# Patient Record
Sex: MALE | Race: WHITE
[De-identification: names, ages, dates, MRNs, and addresses within clinical notes are randomized per-mention and may not be internally consistent; named-entity substitution may affect disease eponyms.]

---

## 2019-10-01 ENCOUNTER — HOSPITAL ENCOUNTER (INPATIENT)
Dept: HOSPITAL 25 - AA | Age: 79
LOS: 2 days | Discharge: HOME HEALTH | DRG: 470 | End: 2019-10-03
Attending: ORTHOPAEDIC SURGERY | Admitting: ORTHOPAEDIC SURGERY
Payer: MEDICARE

## 2019-10-01 DIAGNOSIS — R06.83: ICD-10-CM

## 2019-10-01 DIAGNOSIS — Z79.01: ICD-10-CM

## 2019-10-01 DIAGNOSIS — I34.0: ICD-10-CM

## 2019-10-01 DIAGNOSIS — I47.2: ICD-10-CM

## 2019-10-01 DIAGNOSIS — Z96.651: ICD-10-CM

## 2019-10-01 DIAGNOSIS — Z72.89: ICD-10-CM

## 2019-10-01 DIAGNOSIS — I10: ICD-10-CM

## 2019-10-01 DIAGNOSIS — G89.29: ICD-10-CM

## 2019-10-01 DIAGNOSIS — E66.9: ICD-10-CM

## 2019-10-01 DIAGNOSIS — Z82.3: ICD-10-CM

## 2019-10-01 DIAGNOSIS — M16.11: Primary | ICD-10-CM

## 2019-10-01 DIAGNOSIS — Z88.8: ICD-10-CM

## 2019-10-01 DIAGNOSIS — I48.19: ICD-10-CM

## 2019-10-01 DIAGNOSIS — R73.01: ICD-10-CM

## 2019-10-01 DIAGNOSIS — Z87.891: ICD-10-CM

## 2019-10-01 DIAGNOSIS — E78.5: ICD-10-CM

## 2019-10-01 DIAGNOSIS — M17.12: ICD-10-CM

## 2019-10-01 DIAGNOSIS — I71.2: ICD-10-CM

## 2019-10-01 LAB
APTT PPP: 33.2 SECONDS (ref 26–38)
INR PPP/BLD: 0.98 (ref 0.82–1.09)

## 2019-10-01 PROCEDURE — 85730 THROMBOPLASTIN TIME PARTIAL: CPT

## 2019-10-01 PROCEDURE — 93005 ELECTROCARDIOGRAM TRACING: CPT

## 2019-10-01 PROCEDURE — 85018 HEMOGLOBIN: CPT

## 2019-10-01 PROCEDURE — C1776 JOINT DEVICE (IMPLANTABLE): HCPCS

## 2019-10-01 PROCEDURE — 0SR902A REPLACEMENT OF RIGHT HIP JOINT WITH METAL ON POLYETHYLENE SYNTHETIC SUBSTITUTE, UNCEMENTED, OPEN APPROACH: ICD-10-PCS | Performed by: ORTHOPAEDIC SURGERY

## 2019-10-01 PROCEDURE — 85049 AUTOMATED PLATELET COUNT: CPT

## 2019-10-01 PROCEDURE — 88304 TISSUE EXAM BY PATHOLOGIST: CPT

## 2019-10-01 PROCEDURE — C1713 ANCHOR/SCREW BN/BN,TIS/BN: HCPCS

## 2019-10-01 PROCEDURE — 36415 COLL VENOUS BLD VENIPUNCTURE: CPT

## 2019-10-01 PROCEDURE — 85014 HEMATOCRIT: CPT

## 2019-10-01 PROCEDURE — 72170 X-RAY EXAM OF PELVIS: CPT

## 2019-10-01 PROCEDURE — 88311 DECALCIFY TISSUE: CPT

## 2019-10-01 PROCEDURE — 80048 BASIC METABOLIC PNL TOTAL CA: CPT

## 2019-10-01 PROCEDURE — 85610 PROTHROMBIN TIME: CPT

## 2019-10-01 RX ADMIN — OXYCODONE HYDROCHLORIDE AND ACETAMINOPHEN PRN TAB: 5; 325 TABLET ORAL at 16:07

## 2019-10-01 RX ADMIN — MAGNESIUM HYDROXIDE SCH ML: 400 SUSPENSION ORAL at 20:30

## 2019-10-01 RX ADMIN — OXYCODONE HYDROCHLORIDE AND ACETAMINOPHEN PRN TAB: 5; 325 TABLET ORAL at 20:29

## 2019-10-01 RX ADMIN — CEFAZOLIN SCH MLS/HR: 1 INJECTION, POWDER, FOR SOLUTION INTRAVENOUS at 18:09

## 2019-10-01 RX ADMIN — SODIUM CHLORIDE, SODIUM LACTATE, POTASSIUM CHLORIDE, AND CALCIUM CHLORIDE SCH MLS/HR: 600; 310; 30; 20 INJECTION, SOLUTION INTRAVENOUS at 14:34

## 2019-10-01 RX ADMIN — Medication SCH: at 18:10

## 2019-10-01 RX ADMIN — DOCUSATE SODIUM SCH MG: 100 CAPSULE, LIQUID FILLED ORAL at 20:30

## 2019-10-01 RX ADMIN — METOPROLOL SUCCINATE SCH MG: 50 TABLET, EXTENDED RELEASE ORAL at 18:17

## 2019-10-01 NOTE — CONS
CC:  Dr. Fofana; Irene Queen NP; Dr. Jesus Alberto Youssef

 

CONSULTATION REPORT:

 

DATE OF CONSULT:  10/01/19

 

TIME OF EVALUATION:  01:15 p.m.

 

REQUESTING PHYSICIAN:  Dr. Fofana.

 

PRIMARY CARE PROVIDER:  Irene Queen NP.

 

CARDIOLOGIST:  Dr. Jesus Alberto Youssef, phone number [phone]; Dr. Leonardo Liu at [facility] in [place], [place], phone number [phone].

 

REASON FOR CONSULTATION:  Management of comorbidities.

 

HISTORY OF PRESENT ILLNESS:  Mr. Montanez is a 79-year-old male with a past medical history of hypert
ension, osteoarthritis, atrial fibrillation, thoracic aortic aneurysm, nonsustained ventricular tachy
cardia, obesity with a BMI of 35.2, who was found to have advanced arthritis of the right hip with ch
ronic pain.  He failed conservative management with antiinflammatories, physial therapy, ultrasound-g
uided injections, and continued to have 6/10 pain.  So, at that point, Dr. Fofana recommended right to
kiera hip arthroplasty that was performed today under spinal anesthesia with estimated blood loss less 
than 150 mL.

 

The patient was seen and evaluated in the PACU while he is still recovering from anesthesia and he of
fers no complaints at this time.  As per nurse, the patient was snoring loudly initially when he was 
brought into the PACU, but the patient is not aware of any diagnosis of obstructive sleep apnea.

 

PAST MEDICAL HISTORY:

1.  Obesity with a BMI of 35.2.

2.  Hypertension.

3.  Atrial fibrillation, on anticoagulation with Pradaxa.

4.  Osteoarthritis.

5.  Aortic thoracic aneurysm.  Last echocardiogram done on 19 showed mild dilatation of the asc
ending aorta at 4.1 cm.

6.  Nonsustained ventricular tachycardia in , on beta-blockers since. PAST SURGICAL HISTORY:

1.  Status post knee arthroscopy in  and .

2.  Status post knee replacement in 2013 on the right.

 

MEDICATION LIST:

1.  Acetaminophen 500 mg p.o. daily as needed for pain.

2.  Amlodipine/olmesartan 5/20 mg 1 tablet p.o. daily.

3.  Vitamin C 500 mg p.o. daily.

4.  Vitamin B12 1000 mcg p.o. daily.

5.  Dabigatran 150 mg p.o. b.i.d.

6.  Iron 370 mg p.o. at bedtime.

7.  Metoprolol succinate 50 mg at bedtime.

8.  Multivitamin 1 tablet p.o. daily.

 

ALLERGIES:  With WARFARIN, the patient had the rash.

 

FAMILY HISTORY:  The patient's father  of a stroke when he was 92.  Mother is 99 years old.

 

SOCIAL HISTORY:  The patient is retired.  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  Denies drug u
se.  He was a smoker 20 pack years, but he quit 30 years ago. He drinks 2 to 3 beers, wine in social 
events and 2 to 3 shots of vodka, whisky at bedtime.

 

REVIEW OF SYSTEMS:  A 14-point review of systems was performed and all the pertinent negative an posi
tive findings on the HPI.

 

PHYSICAL EXAMINATION:  Vital Signs:  Temperature 97.0, heart rate is 64, respiratory rate is 19, oxyg
en saturation 95% on room air, blood pressure is 92/67. General:  The patient is an obese elderly gen
tleman, lying in bed in acute distress.  HEENT:  Pupils are equal.  Moist mucous membranes.  CVS:  No
rmal S1, S2. Regular rate and rhythm.  Chest:  Breath sounds present bilaterally with no added sounds
.  Abdomen:  Obese, soft.  Bowel sounds are present.  Extremities:  The patient has a clean dressing 
intact to his right hip and he is still recovering from anesthesia.  So, sensation is not totally bac
k yet.  Neuro:  He is alert and oriented x3.  Able to follow commands.

 

ASSESSMENT AND PLAN:  Mr. Montanez is a 79-year-old male with a past medical history of obesity, hype
rtension, osteoarthritis, atrial fibrillation, on dabigatran, aortic thoracic aneurysm, nonsustained 
ventricular tachycardia, who presented for an elective right total hip arthroplasty and the [facility] service was consulted to help manage his commorientes.

 

1.  Status post right total hip arthroplasty.  Management will be as per ORTHO.

2.  Hypertension.  The patient is hypotensive at this time.  We will resume his antihypertensives as 
tolerated and we prefer to give him metoprolol first as this will also manage his nonsustained ventri
cular tachycardia.

3.  Atrial fibrillation.  The patient will be monitored on telemetry.  We will resume the dabigatran 
when okay with Orthopedics.

4.  Nonsustained ventricular tachycardia.  We will continue to monitor on telemetry.

5.  Probable obstructive sleep apnea.  The patient is obese and was described to have severe snoring 
when he returned from surgery.  I suspect the patient likely has obstructive  sleep apnea.  We will m
onitor his oxygen saturation around the first 24 hours postop and he will need a sleep study as an ou
tpatient.

6.  DVT prophylaxis will be with dabigatran as per orthopedist.

7.  Code status is full.

 

TIME SPENT:  Approximately 60 minutes were spent with patient interview, medical records review, and 
physical examination to complete this consultation.  More than half of this time was spent face-to-fa
ce with the patient in coordination of care.

 

 [id number]/CPS #: [id number]

## 2019-10-01 NOTE — XMS REPORT
Continuity of Care Document (CCD)

 Created on:2019



Patient:Jorge L Montanez

Sex:Male

:1940

External Reference #:[id number]





Demographics







 Address  [address] [place] [address]

 

 Home Phone  [phone]

 

 Mobile Phone  [phone]

 

 Work Phone  [phone]

 

 Email Address  [email or web address]

 

 Preferred Language  en

 

 Marital Status  Not  or 

 

 Yarsanism Affiliation  Unknown

 

 Race  White

 

 Ethnic Group  Not  or 









Author







 Name  Karol Valencia









Care Team Providers







 Name  Role  Phone

 

 Jesus Alberto Russell MD  Care Team Information   [phone]

 

 Lauri Zamudio DR  Care Team Information   [phone]

 

 Augustus Marion  Care Team Information   [phone]

 

 Mónica Fofana DR - Adult  Care Team Information   [phone]



 Reconstructive Orthopaedic Surgery    









Problems







 Active Problems  Provider  Date

 

 Benign essential hypertension  Obdulio Steward MD  Onset: 2006

 

 Aneurysm of thoracic aorta  Paloma Arnett MD  Onset: 2014

 

 Paroxysmal ventricular tachycardia  Paloma Arnett MD  Onset: 2014

 

 Paroxysmal atrial fibrillation  Paloma Arnett MD  Onset: 2015









 Note: 









 Essential hypertension  Irene Queen, RN MS FNP  Onset: 2016







Social History







 Type  Date  Description  Comments

 

 Birth Sex    Unknown  

 

 Tobacco Use  Start: Unknown  Never Smoked Cigarettes  quitted 30yr ago,



       smoking 20 yrsX 1ppd

 

 ETOH Use    Consumes liquor 2 times  2-3 beers/wine



     per day  during social events



       2-3 shots of



       vodhka/whisky qHS

 

 Tobacco Use  Start: Unknown End:  Patient is a former  



   Unknown  smoker  

 

 Smoking Status  Reviewed: 19  Patient is a former  



     smoker  

 

 Enjoy Exercising    Enjoys Exercising  

 

 Exercise Type/Frequency    Exercises regularly  Not as active as he



       was due to left



       ankle pain.







Allergies, Adverse Reactions, Alerts







 Active Allergies  Reaction  Severity  Comments  Date

 

 Coumadin  rash      2014









 Inactive Allergies









 NKDA        03/10/2006







Medications







 Active Medications  SIG  Qnty  Indications  Ordering Provider  Date

 

 Amlodipine/Olmesarta  one tab daily for  90tabs  I10  Irene Queen  2019



 n Medoxomil  blood pressure      C, RN MS ISABELLE  



            5-20mg          



 Tablets          



           

 

 Metoprolol Succinate  Take 1 Tablet AT  90tabs  I48.2  Irene Queen  2017



 ER  Bedtime      C, RN MS [facility]  



   50mg Tablets ER          



 24HR          



           

 

 Pradaxa  Take 1 Capsule  180caps  I48.1  Irene Queen  2015



        150mg  Twice Daily      C, RN MS ISABELLE  



 Capsules          



           









 I48.2

 

 History Medications









 Amlodipine Besylate  1 by mouth every day  90tabs  I10  Irene Queen  2019 -



         KINDRA RN MS [facility]  2019



 5mg Tablets          



           

 

 Boostrix  ,5 cubic centimeters,  .500ml  Z23  Irene Queen  2019 -



   intramuscular x 1      C, RN MS [facility]  2019



 5-2.5-18.5LF-mcg/0.  injection        



 5 Suspension          



           







Medications Administered in Office







 Medication  SIG  Qnty  Indications  Ordering Provider  Date

 

 Depo Medrol 20 MG        Obdulio Steward MD  2001



        Injection          



           







Immunizations







 CPT Code  Status  Date  Vaccine  Lot #

 

 13490  Given  2019  Tdap (Adacel) Ages 7 And Above Only  

 

 11659  Given  2019  Pneumococcal 23 Immunization Adult Or  [id number]



       Immunosuppressed Patient  

 

 64850  Given  2018  Influenza Vac, Quadrivalent, Split, 0.5mL  [id number]



       Dosage, Im Use  

 

 77495  Given  2017  Influenza Vac, Quadrivalent, Split, 0.5mL  [id number]



       Dosage, Im Use  

 

 15823  Given  2016  Influenza Vac, Quadrivalent, Split, 0.5mL  



       Dosage, Im Use  

 

   Given  10/16/2015  Fluzone Trivalent Immunization  

 

 18894  Given  2015  Prevnar 13 Pneumococal Conjugate Vaccine  [id number]

 

 43886  Given  2004  Afluria Or Fluvirin Flu Vac Intramuscular  







Vital Signs







 Date  Vital  Result  Comment

 

 2019  9:53am  Weight  270.00 lb  









 Heart Rate  101 /min  72

 

 BP Systolic  104 mmHg  

 

 BP Diastolic  69 mmHg  

 

 Height  72 inches  6'0"

 

 BMI (Body Mass Index)  36.6 kg/m2  









 2019 12:58pm  Weight  276.25 lb  









 Heart Rate  81 /min  

 

 BP Systolic  114 mmHg  

 

 BP Diastolic  66 mmHg  

 

 Height  72 inches  6'0"

 

 BMI (Body Mass Index)  37.5 kg/m2  







Results







 Test  Date  Facility  Test  Result  H/L  Range  Note

 

 CBC with Auto Diff-[facility]cmg  2019  [facility]  WBC  5.8 K/uL    4.1-11.0  









 RBC  3.53 M/uL  Low  4.60-6.10  

 

 Hemoglobin  12.2 gm/dL  Low  13.5-18.0  

 

 Hematocrit  36.0 %  Low  41.0-53.0  

 

 MCV  101.9 fL  High  80.0-97.0  

 

 MCH  34.6 pg  High  27.0-32.0  

 

 MCHC  34.0 g/dL    32.0-36.0  

 

 RDW  14.3 %    11.5-14.5  

 

 PLT Count  196 K/ul    140-400  

 

 MPV  8.0 FL    7.1-10.7  

 

 Neutrophil  65.3 %    35.0-75.0  

 

 Lymphocyte  21.7 %    16.0-52.0  

 

 Monocyte  10.6 %  High  2.0-10.0  

 

 Eosinophil  1.4 %    0.0-5.0  

 

 Basophil  1.0 %    0.0-4.0  

 

 Abs Neutrophils  3.8 K/uL    2.1-8.0  

 

 Abs Lymphocytes  1.3 K/uL    0.8-5.5  

 

 Abs Monocytes  0.6 K/uL    0.1-1.0  

 

 Abs Eosinophils  0.1 K/uL    0.0-0.5  

 

 Abs Basophils  0.1 K/uL    0.0-0.3  









 Comprehensive Met Panel-[facility]CMG  2019  [facility]  Sodium  141 mmol/L    135-
146  1









 Potassium  4.5 mmol/L    3.5-5.2  

 

 Chloride#  104 mmol/L      2

 

 Carbon Dioxide  27 mmol/L    24-34  

 

 Calcium  9.3 mg/dL    8.5-10.5  3

 

 Glucose  97 mg/dL      

 

 BUN  23 mg/dL    6-26  

 

 Creatinine  1.1 mg/dL    0.5-1.4  

 

 Total Protein  6.2 g/dL    6.0-8.0  

 

 Albumin  4.1 g/dL    3.6-4.9  

 

 Globulin  2.1 g/dL    2.0-3.5  

 

 A/G Ratio  2.0 Ratio    1.0-2.2  

 

 Total Bilirubin  1.0 mg/dL    0.1-1.3  

 

 Alkaline Phosphatase  82 U/L      

 

 Alt  37 U/L    3-42  

 

 Ast  27 U/L    8-42  

 

 Anion Gap  10 mmol/L    5-15  4

 

 Female Egfr  46  Low  >60  5

 

 Male Egfr  61    >60  6









 Laboratory test  2019  [facility]  PSA  3.150 ng/mL    0.000-4.000  7



 finding              

 

 Hemoglobin A1c  2019  [facility]  Hemoglobin A1c  6.4 %  High  4.1-5.9  









 Estimated Average Glucose Calc  137 mg/dL      









 Laboratory test finding  2019  [facility]  Magnesium  1.7 mg/dL    1.5-2.7  

 

 Comprehensive Met Panel-[facility]G  2019  [facility]  Sodium  140 mmol/L    135-
146  8, 9









 Potassium  4.8 mmol/L    3.5-5.2  

 

 Chloride#  105 mmol/L      10

 

 Carbon Dioxide  28 mmol/L    24-34  

 

 Calcium  9.2 mg/dL    8.5-10.5  11

 

 Glucose  128 mg/dL  High    

 

 BUN  22 mg/dL    6-26  

 

 Creatinine  0.8 mg/dL    0.5-1.4  

 

 Total Protein  6.3 g/dL    6.0-8.0  

 

 Albumin  4.1 g/dL    3.6-4.9  

 

 Globulin  2.2 g/dL    2.0-3.5  

 

 A/G Ratio  1.9 Ratio    1.0-2.2  

 

 Total Bilirubin  1.1 mg/dL    0.1-1.3  

 

 Alkaline Phosphatase  105 U/L      

 

 Alt  32 U/L    3-42  

 

 Ast  30 U/L    8-42  

 

 Anion Gap  7 mmol/L    5-15  12

 

 Female Egfr  69    >60  13

 

 Male Egfr  85    >60  14









 Hemoglobin A1c  2019  [facility]  Hemoglobin A1c  5.9 %    4.1-5.9  









 Estimated Average Glucose Calc  123 mg/dL      









 CBC with Auto Diff-fcmg  2019  [facility]  WBC  4.5 K/uL    4.1-11.0  









 RBC  3.99 M/uL  Low  4.60-6.10  

 

 Hemoglobin  13.7 gm/dL    13.5-18.0  

 

 Hematocrit  39.8 %  Low  41.0-53.0  

 

 MCV  99.8 fL  High  80.0-97.0  

 

 MCH  34.4 pg  High  27.0-32.0  

 

 MCHC  34.5 g/dL    32.0-36.0  

 

 RDW  13.5 %    11.5-14.5  

 

 PLT Count  186 K/ul    140-400  

 

 MPV  8.0 FL    7.1-10.7  

 

 Neutrophil  53.9 %    35.0-75.0  

 

 Lymphocyte  30.3 %    16.0-52.0  

 

 Monocyte  11.8 %  High  2.0-10.0  

 

 Eosinophil  2.7 %    0.0-5.0  

 

 Basophil  1.3 %    0.0-4.0  

 

 Abs Neutrophils  2.4 K/uL    2.1-8.0  

 

 Abs Lymphocytes  1.4 K/uL    0.8-5.5  

 

 Abs Monocytes  0.5 K/uL    0.1-1.0  

 

 Abs Eosinophils  0.1 K/uL    0.0-0.5  

 

 Abs Basophils  0.1 K/uL    0.0-0.3  









 Hemoglobin A1c  2019  [facility]  Hemoglobin A1c  6.2 %  High  4.1-5.9  









 Estimated Average Glucose Calc  131 mg/dL      









 Comprehensive Met Panel-FCMG  2019  [facility]  Sodium  142 mmol/L    135-
146  15









 Potassium  4.4 mmol/L    3.5-5.2  

 

 Chloride#  108 mmol/L      16

 

 Carbon Dioxide  25 mmol/L    24-34  

 

 Glucose  119 mg/dL  High    

 

 BUN  22 mg/dL    6-26  

 

 Creatinine  0.8 mg/dL    0.5-1.4  

 

 Calcium  9.5 mg/dL    8.5-10.2  

 

 Total Protein  6.2 g/dL    6.0-8.0  

 

 Albumin  4.4 g/dL    3.6-4.9  

 

 Globulin  1.8 g/dL  Low  2.0-3.5  

 

 A/G Ratio  2.4 Ratio  High  1.0-2.2  

 

 Total Bilirubin  0.7 mg/dL    0.1-1.3  

 

 Alkaline Phosphatase  108 U/L      

 

 Alt  31 U/L    3-42  

 

 Ast  27 U/L    8-42  

 

 Anion Gap  9 mmol/L    5-15  17

 

  Jeannine Egfr  >60    >60  18

 

 Non  Jeannine Egfr  >60    >60  19









 Lipid  2019  [facility]  Cholesterol  163 mg/dL      









 Triglycerides  68 mg/dL      

 

 HDL  59 mg/dL    29-71  20

 

 Chol/ HDL Ratio  2.8 ratio  Low  4.0-6.7  

 

 VLDL  14 mg/dL    2-29  

 

 LDL (Calc)  91 mg/dL    20  21









 1  Updated reference range on new analyzer 3-2017

 

 2  Updated reference range on new analyzer 3-2017

 

 3  Updated reference range 2019

 

 4  Updated Reference Range 

 

 5  Concerning GFR Guidelines for  Americans:



   Normal function or mild renal disease, if clinically at risk:  >/= 60



   mL/min



   Moderately decreased:  30-59



   Severely decreased:  15-29



   Renal failure:  <15



   There is reduced accuracy above 60ml/min/1.73 m squared, but the



   numeric value may be clinically useful in the near 60 range

 

 6  Concerning GFR Guidelines:



   Normal function or mild renal disease, if clinically at risk:  >/= 60



   mL/min



   Moderately decreased:  30-59



   Severely decreased:  15-29



   Renal failure:  <15



   There is reduced accuracy above 60ml/min/1.73 m squared, but the



   numeric value may be clinically useful in the near 60 range



   



   Glomerular Filtration Rate (GFR) is estimated based on the CKD-EPI



   equation, which assumes a steady state for creatinine as recommended



   by the National Kidney Disease Education Program in conjunction with



   the National Institutes of Health and the National Kidney Foundation.



   



   Clinical conditions in which it may be necessary to measure GFR by



   using clearance methods include extremes of age and body size, severe



   malnutrition or obesity, diseases of skeletal muscle, paraplegia or



   quadriplegia, vegetarian diet, rapidly changing kidney function, and



   calculation of the dose of potentially toxic drugs that are excreted



   by the kidneys.

 

 7  Beginning 07 PSA values assayed at [facility] uses



   chemiluminescence methodology manufactured by [employer] for use



   on the DXI analyzer.  Values obtained with different assay methods or



   kits can not be used interchangeably.  Serum PSA measurement is not an



   absolute test for malignancy.  The PSA value should be used in



   conjunction with information available from clinical evaluation and



   other diagnostic procedures.

 

 8  This sample is drawn by:[facility].

 

 9  Updated reference range on new analyzer 3-2017

 

 10  Updated reference range on new analyzer 3-2017

 

 11  Updated reference range 2019

 

 12  Updated Reference Range 

 

 13  Concerning GFR Guidelines for  Americans:



   Normal function or mild renal disease, if clinically at risk:  >/= 60



   mL/min



   Moderately decreased:  30-59



   Severely decreased:  15-29



   Renal failure:  <15



   There is reduced accuracy above 60ml/min/1.73 m squared, but the



   numeric value may be clinically useful in the near 60 range

 

 14  Concerning GFR Guidelines:



   Normal function or mild renal disease, if clinically at risk:  >/= 60



   mL/min



   Moderately decreased:  30-59



   Severely decreased:  15-29



   Renal failure:  <15



   There is reduced accuracy above 60ml/min/1.73 m squared, but the



   numeric value may be clinically useful in the near 60 range



   



   Glomerular Filtration Rate (GFR) is estimated based on the CKD-EPI



   equation, which assumes a steady state for creatinine as recommended



   by the National Kidney Disease Education Program in conjunction with



   the National Institutes of Health and the National Kidney Foundation.



   



   Clinical conditions in which it may be necessary to measure GFR by



   using clearance methods include extremes of age and body size, severe



   malnutrition or obesity, diseases of skeletal muscle, paraplegia or



   quadriplegia, vegetarian diet, rapidly changing kidney function, and



   calculation of the dose of potentially toxic drugs that are excreted



   by the kidneys.

 

 15  Updated reference range on new analyzer 3-2017

 

 16  Updated reference range on new analyzer 3-2017

 

 17  Updated Reference Range 

 

 18  Concerning GFR Guidelines for  Americans:



   Normal function or mild renal disease, if clinically at risk:  >/= 60



   mL/min



   Moderately decreased:  30-59



   Severely decreased:  15-29



   Renal failure:  <15

 

 19  Concerning GFR Guidelines:



   Normal function or mild renal disease, if clinically at risk:  >/= 60



   mL/min



   Moderately decreased:  30-59



   Severely decreased:  15-29



   Renal failure:  <15



   



   Glomerular Filtration Rate (GFR) is estimated based on the MDRD



   equation, which assumes a steady state for creatinine as recommended



   by the National Kidney Disease Education Program in conjunction with



   the National Institutes of Health and the National Kidney Foundation.



   



   Clinical conditions in which it may be necessary to measure GFR by



   using clearance methods include extremes of age and body size, severe



   malnutrition or obesity, diseases of skeletal muscle, paraplegia or



   quadriplegia, vegetarian diet, rapidly changing kidney function, and



   calculation of the dose of potentially toxic drugs that are excreted



   by the kidneys.

 

 20  Per NCEP ATP III Guidelines:



   Results lower than 40 mg/dL are suggestive of increased risk for



   coronary artery disease.  Results > or = to 60 mg/dL are considered a



   negative risk factor.

 

 21  Per NCEP ATP III Guidelines:



   Normal Population <130



   Patients with medical conditions: CHD/DM



   Optimal: <100



   Borderline high: 130-159



   High: 160-189



   Very high: >189







Procedures







 Date  Code  Description  Status

 

 2019  32462  ECHO Transthoracis 2D W Spectral Doppler  Completed

 

 2019  98839  Electrocardiogram Complete  Completed

 

 2015  74162589  Colonoscopy  Completed

 

 2010  21930722  Colonoscopy  Completed







Medical Devices







 Description

 

 No Information Available







Encounters







 Type  Date  Location  Provider  Dx  Diagnosis

 

 Office Visit  2019  Irene Andrea,  Z01.818  Encounter for 
other



   10:20a    RN MS [facility]    preprocedural



           examination









 I34.0  Nonrheumatic mitral (valve) insufficiency

 

 I10  Essential (primary) hypertension

 

 I48.1  Persistent atrial fibrillation

 

 E66.9  Obesity, unspecified

 

 Z12.5  Encounter for screening for malignant neoplasm of prostate

 

 R73.01  Impaired fasting glucose

 

 Z68.36  Body mass index (BMI) 36.0-36.9, adult









 Office Visit  2019  1:00p  Irene Andrea I10  Essential (
primary)



       RN MS [facility]    hypertension









 R73.01  Impaired fasting glucose









 Office Visit  2019  8:20a  Irene Andrea,  E66.9  Obesity, 
unspecified



       RN MS [facility]    









 I10  Essential (primary) hypertension

 

 I48.2  Chronic atrial fibrillation

 

 M25.551  Pain in RIGHT hip

 

 M25.559  Pain in unspecified hip

 

 Z68.37  Body mass index (BMI) 37.0-37.9, adult









 Office Visit  2019  8:20a  Irene Andrea,  Z00.00  Encntr 
for general



       RN MS [facility]    adult medical exam



           w/o abnormal



           findings









 R73.01  Impaired fasting glucose

 

 I10  Essential (primary) hypertension

 

 M25.551  Pain in RIGHT hip

 

 E55.9  Vitamin D deficiency, unspecified

 

 Z23  Encounter for immunization

 

 D64.9  Anemia, unspecified

 

 Z13.31  Encounter for screening for depression

 

 Z68.37  Body mass index (BMI) 37.0-37.9, adult







Assessments







 Date  Code  Description  Provider

 

 2019  I10  Essential (primary) hypertension  [facility]

 

 2019  Z01.818  Encounter for other preprocedural  Irene Queen RN 
[facility]



     examination  

 

 2019  I34.0  Nonrheumatic mitral (valve)  [facility]



     insufficiency  

 

 2019  I34.0  Nonrheumatic mitral (valve)  Irene Queen RN MS [facility]



     insufficiency  

 

 2019  I48.1  Persistent atrial fibrillation  [facility]

 

 2019  I10  Essential (primary) hypertension  Irene Queen, RN [facility]

 

 2019  I48.1  Persistent atrial fibrillation  Irene Queen, RN [facility]

 

 2019  E66.9  Obesity, unspecified  Irene Queen, RN [facility]

 

 2019  Z12.5  Encounter for screening for malignant  Irene Queen, 
RN [facility]



     neoplasm of prostate  

 

 2019  R73.01  Impaired fasting glucose  Irene Queen, RN [facility]

 

 2019  Z68.36  Body mass index (BMI) 36.0-36.9,  Irene Queen, RN [facility]



     adult  

 

 2019  I34.0  Nonrheumatic mitral (valve)  [facility]



     insufficiency  

 

 2019  I10  Essential (primary) hypertension  [facility]

 

 2019  Z12.5  Encounter for screening for malignant  [facility]



     neoplasm of prostate  

 

 2019  R73.01  Impaired fasting glucose  [facility]

 

 2019  I48.1  Persistent atrial fibrillation  [facility]

 

 2019  I10  Essential (primary) hypertension  Irene Queen RN [facility]

 

 2019  R73.01  Impaired fasting glucose  Irene Queen, RN [facility]

 

 2019  D64.9  Anemia, unspecified  Nikole Milligan MD

 

 2019  I10  Essential (primary) hypertension  [facility]

 

 2019  I10  Essential (primary) hypertension  Nikole Milligan MD

 

 2019  R73.01  Impaired fasting glucose  [facility]

 

 2019  R73.01  Impaired fasting glucose  Nikole Milligan MD

 

 2019  Z68.37  Body mass index (BMI) 37.0-37.9,  [facility]



     adult  

 

 2019  Z68.37  Body mass index (BMI) 37.0-37.9,  Nikole Milligan MD



     adult  

 

 2019  D64.9  Anemia, unspecified  [facility]

 

 2019  E66.9  Obesity, unspecified  Irene Queen, RN [facility]

 

 2019  I10  Essential (primary) hypertension  Irene Queen RN [facility]

 

 2019  I48.2  Chronic atrial fibrillation  Irene Queen, RN [facility]

 

 2019  M25.551  Pain in RIGHT hip  Irene Queen, RN [facility]

 

 2019  M25.559  Pain in unspecified hip  Irene Queen, RN [facility]

 

 2019  Z68.37  Body mass index (BMI) 37.0-37.9,  Irene Queen, MARI [facility]



     adult  

 

 2019  Z00.00  Encounter for general adult medical  Irene Queen RN 
[facility]



     examination without abno  

 

 2019  I10  Essential (primary) hypertension  [facility]

 

 2019  R73.01  Impaired fasting glucose  Irene Queen RN [facility]

 

 2019  I10  Essential (primary) hypertension  Irene Queen RN [facility]

 

 2019  M25.551  Pain in RIGHT hip  Irene Queen RN [facility]

 

 2019  E55.9  Vitamin D deficiency, unspecified  Irene Queen, RN [facility]

 

 2019  Z23  Encounter for immunization  Irene Queen RN [facility]

 

 2019  D64.9  Anemia, unspecified  Irene Queen, RN [facility]

 

 2019  Z13.31  Encounter for screening for  Irene Queen RN [facility]



     depression  

 

 2019  Z68.37  Body mass index (BMI) 37.0-37.9,  Irene Queen, MARI [facility]



     adult  

 

 2019  R73.01  Impaired fasting glucose  [facility]







Plan of Treatment

2019 [facility] for other 
preprocedural uwuwidlhohuO11.0 Nonrheumatic mitral (valve) atkkygvoswxlyU08 
Essential (primary) xypmvonmnwxsI07.1 Persistent atrial fibrillationComments:
SEES CARDIOLOGY ON  TO REVIEW DIRECTIONS FOR ANTICOAGULATION PRE AND POST 
OPE66.9 Obesity, rivaclhsbohA99.5 Encounter for screening for malignant 
neoplasm of ullmskmdA02.01 Impaired fasting seehlbhL62.36 Body mass index (BMI) 
36.0-36.9, adult



Functional Status







 Description

 

 No Information Available







Mental Status







 Description

 

 No Information Available







Referrals







 Refer to   Reason for Referral  Status  Appt Date

 

 Mónica Fofana DR  CONSULT FOR RT HIP PAIN, PLEASE SEND  Received Complete  



   XRAY REPORT  19- FAXED -DS 6/3    



   SPOKE TO [facility] AT OFFICE- REFERRAL    



   WAS RECEIVED AND THEY WILL NOTIFY US    



   WHEN PT IS SCHEDULED..TW  SPOKE TO    



   [facility] AT OFFICE- THEY HAVE REFERRAL    



   BUT PT HAS NOT BEEN SCHEDULED YET AND    



   SHE IS NOT SURE WHY- LMOM FOR PT TO    



   CALL ME TO ADVISE HIM TO CALL OFFICE    



   FOR APPT..TW -PT CALLED BACK AND    



   GIVEN THE INFO TO CALL [place] [address] [phone]

## 2019-10-01 NOTE — XMS REPORT
Continuity of Care Document (CCD)

 Created on:2019



Patient:Jorge L Montanez

Sex:Male

:1940

External Reference #:[id number]





Demographics







 Address  [address] [place] [address]

 

 Home Phone  [phone]

 

 Mobile Phone  [phone]

 

 Work Phone  [phone]

 

 Email Address  [email or web address]

 

 Preferred Language  en

 

 Marital Status  Not  or 

 

 [employer] Affiliation  Unknown

 

 Race  White

 

 Ethnic Group  Not  or 









Author







 Name  Karol Valencia









Care Team Providers







 Name  Role  Phone

 

 Jesus Alberto Russell MD  Care Team Information   [phone]

 

 Lauri Zaumdio DR  Care Team Information   [phone]

 

 Augustus Marion  Care Team Information   [phone]

 

 Mónica Fofana DR - Adult  Care Team Information   [phone]



 Reconstructive Orthopaedic Surgery    









Problems







 Active Problems  Provider  Date

 

 Benign essential hypertension  Obdulio Steward MD  Onset: 2006

 

 Aneurysm of thoracic aorta  Paloma Arnett MD  Onset: 2014

 

 Paroxysmal ventricular tachycardia  Paloma Arnett MD  Onset: 2014

 

 Paroxysmal atrial fibrillation  Paloma Arnett MD  Onset: 2015









 Note: 









 Essential hypertension  Irene Queen, RN MS FNP  Onset: 2016







Social History







 Type  Date  Description  Comments

 

 Birth Sex    Unknown  

 

 Tobacco Use  Start: Unknown  Never Smoked Cigarettes  quitted 30yr ago,



       smoking 20 yrsX 1ppd

 

 ETOH Use    Consumes liquor 2 times  2-3 beers/wine



     per day  during social events



       2-3 shots of



       vodhka/whisky qHS

 

 Tobacco Use  Start: Unknown End:  Patient is a former  



   Unknown  smoker  

 

 Smoking Status  Reviewed: 19  Patient is a former  



     smoker  

 

 Enjoy Exercising    Enjoys Exercising  

 

 Exercise Type/Frequency    Exercises regularly  Not as active as he



       was due to left



       ankle pain.







Allergies, Adverse Reactions, Alerts







 Active Allergies  Reaction  Severity  Comments  Date

 

 Coumadin  rash      2014









 Inactive Allergies









 NKDA        03/10/2006







Medications







 Active Medications  SIG  Qnty  Indications  Ordering Provider  Date

 

 Amlodipine/Olmesarta  one tab daily for  90tabs  I10  Irene Queen  2019



 n Medoxomil  blood pressure      C, RN MS ISABELLE  



            5-20mg          



 Tablets          



           

 

 Metoprolol Succinate  Take 1 Tablet AT  90tabs  I48.2  Irene Queen  2017



 ER  Bedtime      C, RN MS WALTON  



   50mg Tablets ER          



 24HR          



           

 

 Pradaxa  Take 1 Capsule  180caps  I48.1  Irene Queen  2015



        150mg  Twice Daily      C, RN MS ISABELLE  



 Capsules          



           









 I48.2

 

 History Medications









 Amlodipine Besylate  1 by mouth every day  90tabs  I10  Irene Queen  2019 -



         KINDRA RN MS WALTON  2019



 5mg Tablets          



           

 

 Boostrix  ,5 cubic centimeters,  .500ml  Z23  Irene Queen  2019 -



   intramuscular x 1      C, RN MS WALTON  2019



 5-2.5-18.5LF-mcg/0.  injection        



 5 Suspension          



           







Medications Administered in Office







 Medication  SIG  Qnty  Indications  Ordering Provider  Date

 

 Depo Medrol 20 MG        Obdulio Stewadr MD  2001



        Injection          



           







Immunizations







 CPT Code  Status  Date  Vaccine  Lot #

 

 [id number]  Given  2019  Tdap (Adacel) Ages 7 And Above Only  

 

 59948  Given  2019  Pneumococcal 23 Immunization Adult Or  [id number]



       Immunosuppressed Patient  

 

 66290  Given  2018  Influenza Vac, Quadrivalent, Split, 0.5mL  [id number]



       Dosage, Im Use  

 

 19781  Given  2017  Influenza Vac, Quadrivalent, Split, 0.5mL  [id number]



       Dosage, Im Use  

 

 64891  Given  2016  Influenza Vac, Quadrivalent, Split, 0.5mL  



       Dosage, Im Use  

 

   Given  10/16/2015  Fluzone Trivalent Immunization  

 

 92534  Given  2015  Prevnar 13 Pneumococal Conjugate Vaccine  [id number]

 

 15333  Given  2004  Afluria Or Fluvirin Flu Vac Intramuscular  







Vital Signs







 Date  Vital  Result  Comment

 

 2019  9:53am  Weight  270.00 lb  









 Heart Rate  101 /min  72

 

 BP Systolic  104 mmHg  

 

 BP Diastolic  69 mmHg  

 

 Height  72 inches  6'0"

 

 BMI (Body Mass Index)  36.6 kg/m2  









 2019 12:58pm  Weight  276.25 lb  









 Heart Rate  81 /min  

 

 BP Systolic  114 mmHg  

 

 BP Diastolic  66 mmHg  

 

 Height  72 inches  6'0"

 

 BMI (Body Mass Index)  37.5 kg/m2  







Results







 Test  Date  Facility  Test  Result  H/L  Range  Note

 

 CBC with Auto Diff-fcmg  2019  [facility]  WBC  5.8 K/uL    4.1-11.0  









 RBC  3.53 M/uL  Low  4.60-6.10  

 

 Hemoglobin  12.2 gm/dL  Low  13.5-18.0  

 

 Hematocrit  36.0 %  Low  41.0-53.0  

 

 MCV  101.9 fL  High  80.0-97.0  

 

 MCH  34.6 pg  High  27.0-32.0  

 

 MCHC  34.0 g/dL    32.0-36.0  

 

 RDW  14.3 %    11.5-14.5  

 

 PLT Count  196 K/ul    140-400  

 

 MPV  8.0 FL    7.1-10.7  

 

 Neutrophil  65.3 %    35.0-75.0  

 

 Lymphocyte  21.7 %    16.0-52.0  

 

 Monocyte  10.6 %  High  2.0-10.0  

 

 Eosinophil  1.4 %    0.0-5.0  

 

 Basophil  1.0 %    0.0-4.0  

 

 Abs Neutrophils  3.8 K/uL    2.1-8.0  

 

 Abs Lymphocytes  1.3 K/uL    0.8-5.5  

 

 Abs Monocytes  0.6 K/uL    0.1-1.0  

 

 Abs Eosinophils  0.1 K/uL    0.0-0.5  

 

 Abs Basophils  0.1 K/uL    0.0-0.3  









 Comprehensive Met Panel-FCMG  2019  [facility]  Sodium  141 mmol/L    135-
146  1









 Potassium  4.5 mmol/L    3.5-5.2  

 

 Chloride#  104 mmol/L      2

 

 Carbon Dioxide  27 mmol/L    24-34  

 

 Calcium  9.3 mg/dL    8.5-10.5  3

 

 Glucose  97 mg/dL      

 

 BUN  23 mg/dL    6-26  

 

 Creatinine  1.1 mg/dL    0.5-1.4  

 

 Total Protein  6.2 g/dL    6.0-8.0  

 

 Albumin  4.1 g/dL    3.6-4.9  

 

 Globulin  2.1 g/dL    2.0-3.5  

 

 A/G Ratio  2.0 Ratio    1.0-2.2  

 

 Total Bilirubin  1.0 mg/dL    0.1-1.3  

 

 Alkaline Phosphatase  82 U/L      

 

 Alt  37 U/L    3-42  

 

 Ast  27 U/L    8-42  

 

 Anion Gap  10 mmol/L    5-15  4

 

 Female Egfr  46  Low  >60  5

 

 Male Egfr  61    >60  6









 Laboratory test  2019  [facility]  PSA  3.150 ng/mL    0.000-4.000  7



 finding              

 

 Hemoglobin A1c  2019  [facility]  Hemoglobin A1c  6.4 %  High  4.1-5.9  









 Estimated Average Glucose Calc  137 mg/dL      









 Laboratory test finding  2019  [facility]  Magnesium  1.7 mg/dL    1.5-2.7  

 

 Comprehensive Met Panel-FCMG  2019  [facility]  Sodium  140 mmol/L    135-
146  8, 9









 Potassium  4.8 mmol/L    3.5-5.2  

 

 Chloride#  105 mmol/L      10

 

 Carbon Dioxide  28 mmol/L    24-34  

 

 Calcium  9.2 mg/dL    8.5-10.5  11

 

 Glucose  128 mg/dL  High    

 

 BUN  22 mg/dL    6-26  

 

 Creatinine  0.8 mg/dL    0.5-1.4  

 

 Total Protein  6.3 g/dL    6.0-8.0  

 

 Albumin  4.1 g/dL    3.6-4.9  

 

 Globulin  2.2 g/dL    2.0-3.5  

 

 A/G Ratio  1.9 Ratio    1.0-2.2  

 

 Total Bilirubin  1.1 mg/dL    0.1-1.3  

 

 Alkaline Phosphatase  105 U/L      

 

 Alt  32 U/L    3-42  

 

 Ast  30 U/L    8-42  

 

 Anion Gap  7 mmol/L    5-15  12

 

 Female Egfr  69    >60  13

 

 Male Egfr  85    >60  14









 Hemoglobin A1c  2019  [facility]  Hemoglobin A1c  5.9 %    4.1-5.9  









 Estimated Average Glucose Calc  123 mg/dL      









 CBC with Auto Diff-fcmg  2019  [facility]  WBC  4.5 K/uL    4.1-11.0  









 RBC  3.99 M/uL  Low  4.60-6.10  

 

 Hemoglobin  13.7 gm/dL    13.5-18.0  

 

 Hematocrit  39.8 %  Low  41.0-53.0  

 

 MCV  99.8 fL  High  80.0-97.0  

 

 MCH  34.4 pg  High  27.0-32.0  

 

 MCHC  34.5 g/dL    32.0-36.0  

 

 RDW  13.5 %    11.5-14.5  

 

 PLT Count  186 K/ul    140-400  

 

 MPV  8.0 FL    7.1-10.7  

 

 Neutrophil  53.9 %    35.0-75.0  

 

 Lymphocyte  30.3 %    16.0-52.0  

 

 Monocyte  11.8 %  High  2.0-10.0  

 

 Eosinophil  2.7 %    0.0-5.0  

 

 Basophil  1.3 %    0.0-4.0  

 

 Abs Neutrophils  2.4 K/uL    2.1-8.0  

 

 Abs Lymphocytes  1.4 K/uL    0.8-5.5  

 

 Abs Monocytes  0.5 K/uL    0.1-1.0  

 

 Abs Eosinophils  0.1 K/uL    0.0-0.5  

 

 Abs Basophils  0.1 K/uL    0.0-0.3  









 Hemoglobin A1c  2019  [facility]  Hemoglobin A1c  6.2 %  High  4.1-5.9  









 Estimated Average Glucose Calc  131 mg/dL      









 Comprehensive Met Panel-FCMG  2019  [facility]  Sodium  142 mmol/L    135-
146  15









 Potassium  4.4 mmol/L    3.5-5.2  

 

 Chloride#  108 mmol/L      16

 

 Carbon Dioxide  25 mmol/L    24-34  

 

 Glucose  119 mg/dL  High    

 

 BUN  22 mg/dL    6-26  

 

 Creatinine  0.8 mg/dL    0.5-1.4  

 

 Calcium  9.5 mg/dL    8.5-10.2  

 

 Total Protein  6.2 g/dL    6.0-8.0  

 

 Albumin  4.4 g/dL    3.6-4.9  

 

 Globulin  1.8 g/dL  Low  2.0-3.5  

 

 A/G Ratio  2.4 Ratio  High  1.0-2.2  

 

 Total Bilirubin  0.7 mg/dL    0.1-1.3  

 

 Alkaline Phosphatase  108 U/L      

 

 Alt  31 U/L    3-42  

 

 Ast  27 U/L    8-42  

 

 Anion Gap  9 mmol/L    5-15  17

 

  Jeannine Egfr  >60    >60  18

 

 Non  Jeannine Egfr  >60    >60  19









 Lipid  2019  [facility]  Cholesterol  163 mg/dL      









 Triglycerides  68 mg/dL      

 

 HDL  59 mg/dL    29-71  20

 

 Chol/ HDL Ratio  2.8 ratio  Low  4.0-6.7  

 

 VLDL  14 mg/dL    2-29  

 

 LDL (Calc)  91 mg/dL    20  21









 1  Updated reference range on new analyzer 3-2017

 

 2  Updated reference range on new analyzer 3-2017

 

 3  Updated reference range 2019

 

 4  Updated Reference Range 

 

 5  Concerning GFR Guidelines for  Americans:



   Normal function or mild renal disease, if clinically at risk:  >/= 60



   mL/min



   Moderately decreased:  30-59



   Severely decreased:  15-29



   Renal failure:  <15



   There is reduced accuracy above 60ml/min/1.73 m squared, but the



   numeric value may be clinically useful in the near 60 range

 

 6  Concerning GFR Guidelines:



   Normal function or mild renal disease, if clinically at risk:  >/= 60



   mL/min



   Moderately decreased:  30-59



   Severely decreased:  15-29



   Renal failure:  <15



   There is reduced accuracy above 60ml/min/1.73 m squared, but the



   numeric value may be clinically useful in the near 60 range



   



   Glomerular Filtration Rate (GFR) is estimated based on the CKD-EPI



   equation, which assumes a steady state for creatinine as recommended



   by the National Kidney Disease Education Program in conjunction with



   the National Institutes of Health and the National Kidney Foundation.



   



   Clinical conditions in which it may be necessary to measure GFR by



   using clearance methods include extremes of age and body size, severe



   malnutrition or obesity, diseases of skeletal muscle, paraplegia or



   quadriplegia, vegetarian diet, rapidly changing kidney function, and



   calculation of the dose of potentially toxic drugs that are excreted



   by the kidneys.

 

 7  Beginning 07 PSA values assayed at [facility] uses



   chemiluminescence methodology manufactured by [employer] for use



   on the DXI analyzer.  Values obtained with different assay methods or



   kits can not be used interchangeably.  Serum PSA measurement is not an



   absolute test for malignancy.  The PSA value should be used in



   conjunction with information available from clinical evaluation and



   other diagnostic procedures.

 

 8  This sample is drawn by:[facility].

 

 9  Updated reference range on new analyzer 3-2017

 

 10  Updated reference range on new analyzer 3-2017

 

 11  Updated reference range 2019

 

 12  Updated Reference Range 

 

 13  Concerning GFR Guidelines for  Americans:



   Normal function or mild renal disease, if clinically at risk:  >/= 60



   mL/min



   Moderately decreased:  30-59



   Severely decreased:  15-29



   Renal failure:  <15



   There is reduced accuracy above 60ml/min/1.73 m squared, but the



   numeric value may be clinically useful in the near 60 range

 

 14  Concerning GFR Guidelines:



   Normal function or mild renal disease, if clinically at risk:  >/= 60



   mL/min



   Moderately decreased:  30-59



   Severely decreased:  15-29



   Renal failure:  <15



   There is reduced accuracy above 60ml/min/1.73 m squared, but the



   numeric value may be clinically useful in the near 60 range



   



   Glomerular Filtration Rate (GFR) is estimated based on the CKD-EPI



   equation, which assumes a steady state for creatinine as recommended



   by the National Kidney Disease Education Program in conjunction with



   the National Institutes of Health and the National Kidney Foundation.



   



   Clinical conditions in which it may be necessary to measure GFR by



   using clearance methods include extremes of age and body size, severe



   malnutrition or obesity, diseases of skeletal muscle, paraplegia or



   quadriplegia, vegetarian diet, rapidly changing kidney function, and



   calculation of the dose of potentially toxic drugs that are excreted



   by the kidneys.

 

 15  Updated reference range on new analyzer 3-2017

 

 16  Updated reference range on new analyzer 3-2017

 

 17  Updated Reference Range 

 

 18  Concerning GFR Guidelines for  Americans:



   Normal function or mild renal disease, if clinically at risk:  >/= 60



   mL/min



   Moderately decreased:  30-59



   Severely decreased:  15-29



   Renal failure:  <15

 

 19  Concerning GFR Guidelines:



   Normal function or mild renal disease, if clinically at risk:  >/= 60



   mL/min



   Moderately decreased:  30-59



   Severely decreased:  15-29



   Renal failure:  <15



   



   Glomerular Filtration Rate (GFR) is estimated based on the MDRD



   equation, which assumes a steady state for creatinine as recommended



   by the National Kidney Disease Education Program in conjunction with



   the National Institutes of Health and the National Kidney Foundation.



   



   Clinical conditions in which it may be necessary to measure GFR by



   using clearance methods include extremes of age and body size, severe



   malnutrition or obesity, diseases of skeletal muscle, paraplegia or



   quadriplegia, vegetarian diet, rapidly changing kidney function, and



   calculation of the dose of potentially toxic drugs that are excreted



   by the kidneys.

 

 20  Per NCEP ATP III Guidelines:



   Results lower than 40 mg/dL are suggestive of increased risk for



   coronary artery disease.  Results > or = to 60 mg/dL are considered a



   negative risk factor.

 

 21  Per NCEP ATP III Guidelines:



   Normal Population <130



   Patients with medical conditions: CHD/DM



   Optimal: <100



   Borderline high: 130-159



   High: 160-189



   Very high: >189







Procedures







 Date  Code  Description  Status

 

 2019  90658  ECHO Transthoracis 2D W Spectral Doppler  Completed

 

 2015  99818933  Colonoscopy  Completed

 

 2010  51737127  Colonoscopy  Completed







Medical Devices







 Description

 

 No Information Available







Encounters







 Type  Date  Location  Provider  Dx  Diagnosis

 

 Office Visit  2019  Irene Andrea,  I10  Essential (primary)



   1:00p    RN [facility]    hypertension









 R73.01  Impaired fasting glucose









 Office Visit  2019  8:20a  Irene Andrea,  E66.9  Obesity, 
unspecified



       RN [facility]    









 I10  Essential (primary) hypertension

 

 I48.2  Chronic atrial fibrillation

 

 M25.551  Pain in RIGHT hip

 

 M25.559  Pain in unspecified hip

 

 Z68.37  Body mass index (BMI) 37.0-37.9, adult









 Office Visit  2019  8:20a  Irene Andrea,  Z00.00  Encntr 
for general



       RN [facility]    adult medical exam



           w/o abnormal



           findings









 R73.01  Impaired fasting glucose

 

 I10  Essential (primary) hypertension

 

 M25.551  Pain in RIGHT hip

 

 E55.9  Vitamin D deficiency, unspecified

 

 Z23  Encounter for immunization

 

 D64.9  Anemia, unspecified

 

 Z13.31  Encounter for screening for depression

 

 Z68.37  Body mass index (BMI) 37.0-37.9, adult







Assessments







 Date  Code  Description  Provider

 

 2019  I10  Essential (primary) hypertension  [facility]

 

 2019  Z01.818  Encounter for other preprocedural  Irene Queen RN 
[facility]



     examination  

 

 2019  I34.0  Nonrheumatic mitral (valve)  [facility]



     insufficiency  

 

 2019  I34.0  Nonrheumatic mitral (valve)  Irene Queen RN [facility]



     insufficiency  

 

 2019  I48.1  Persistent atrial fibrillation  [facility]

 

 2019  I10  Essential (primary) hypertension  Irene Queen RN [facility]

 

 2019  I48.1  Persistent atrial fibrillation  Irene Queen, RN [facility]

 

 2019  E66.9  Obesity, unspecified  Irene Queen, RN [facility]

 

 2019  Z12.5  Encounter for screening for malignant  Irene Queen RN [facility]



     neoplasm of prostate  

 

 2019  R73.01  Impaired fasting glucose  Irene Queen RN [facility]

 

 2019  Z68.36  Body mass index (BMI) 36.0-36.9,  Irene Queen, RN [facility]



     adult  

 

 2019  I34.0  [facility]rhe[facility]valve)  [facility]



     insufficiency  

 

 2019  I10  Essential (primary) hypertension  [facility]

 

 2019  Z12.5  Encounter for screening for malignant  [facility]



     neoplasm of prostate  

 

 2019  R73.01  Impaired fasting glucose  [facility]

 

 2019  I48.1  Persistent atrial fibrillation  [facility]

 

 2019  I10  Essential (primary) hypertension  Irene Queen, RN [facility]

 

 2019  R73.01  Impaired fasting glucose  Irene Queen, RN [facility]

 

 2019  D64.9  Anemia, unspecified  Nikole Milligan MD

 

 2019  I10  Essential (primary) hypertension  [facility]

 

 2019  I10  Essential (primary) hypertension  Nikole Milligan MD

 

 2019  R73.01  Impaired fasting glucose  [facility]

 

 2019  R73.01  Impaired fasting glucose  Nikole Milligan MD

 

 2019  Z68.37  Body mass index (BMI) 37.0-37.9,  [facility]



     adult  

 

 2019  Z68.37  Body mass index (BMI) 37.0-37.9,  Nikole Milligan MD



     adult  

 

 2019  D64.9  Anemia, unspecified  [facility]

 

 2019  E66.9  Obesity, unspecified  Irene Queen, RN [facility]

 

 2019  I10  Essential (primary) hypertension  Irene Queen, RN [facility]

 

 2019  I48.2  Chronic atrial fibrillation  Irene Queen, RN [facility]

 

 2019  M25.551  Pain in RIGHT hip  Irene Queen, RN [facility]

 

 2019  M25.559  Pain in unspecified hip  Irene Queen, RN [facility]

 

 2019  Z68.37  Body mass index (BMI) 37.0-37.9,  Irene Queen, RN [facility]



     adult  

 

 2019  Z00.00  Encounter for general adult medical  Irene Queen, RN 
[facility]



     examination without abno  

 

 2019  I10  Essential (primary) hypertension  [facility]

 

 2019  R73.01  Impaired fasting glucose  Irene Queen, RN [facility]

 

 2019  I10  Essential (primary) hypertension  Irene Queen, RN [facility]

 

 2019  M25.551  Pain in RIGHT hip  Irene Queen, RN [facility]

 

 2019  E55.9  Vitamin D deficiency, unspecified  Irene Queen, RN [facility]

 

 2019  Z23  Encounter for immunization  Irene Queen RN [facility]

 

 2019  D64.9  Anemia, unspecified  Irene Queen, RN [facility]

 

 2019  Z13.31  Encounter for screening for  Irene Queen RN [facility]



     depression  

 

 2019  Z68.37  Body mass index (BMI) 37.0-37.9,  Irene Queen, MARI [facility]



     adult  

 

 2019  R73.01  Impaired fasting glucose  [facility]







Plan of Treatment

2019 - [facility] Encounter for other 
preprocedural gwohshpmfsrI97.0 Nonrheumatic mitral (valve) insufficiencyNew 
Orders:ECHO Cardiogram, Scheduled: 19I10 Essential (primary) 
vhkeqdlcyajpC47.1 Persistent atrial fibrillationNew Orders:Holter Monitor 24 
Hour, Scheduled: 19Comments:SEES CARDIOLOGY ON  TO REVIEW DIRECTIONS 
FOR ANTICOAGULATION PRE AND POST OPE66.9 Obesity, gyhjrcdxolgR60.5 Encounter 
for screening for malignant neoplasm of nfcdqqfdE75.01 Impaired fasting 
ngzjnsnG14.36 Body mass index (BMI) 36.0-36.9, adult



Functional Status







 Description

 

 No Information Available







Mental Status







 Description

 

 No Information Available







Referrals







 Refer to   Reason for Referral  Status  Appt Date

 

 Mónica Fofana DR  CONSULT FOR RT HIP PAIN, PLEASE SEND  Received Complete  



   XRAY REPORT  19- FAXED -DS 6/3    



   SPOKE TO [facility]ANN[facility] AT OFFICE- REFERRAL    



   WAS RECEIVED AND THEY WILL NOTIFY US    



   WHEN PT IS SCHEDULED..TW  SPOKE TO    



   [facility]ANNON AT OFFICE- THEY HAVE REFERRAL    



   BUT PT HAS NOT BEEN SCHEDULED YET AND    



   SHE IS NOT SURE WHY- LMOM FOR PT TO    



   CALL ME TO ADVISE HIM TO CALL OFFICE    



   FOR APPT..TW -PT CALLED BACK AND    



   GIVEN THE INFO TO CALL TO SCHEDULE-[facility] TOBIAS

 

 [place] [address] [phone]

## 2019-10-01 NOTE — XMS REPORT
Continuity of Care Document (CCD)

 Created on:2019



Patient:Jorge L Montanez

Sex:Male

:1940

External Reference #:[id number]





Demographics







 Address  [address] [place] [address]

 

 Home Phone  [phone]

 

 Mobile Phone  [phone]

 

 Work Phone  [phone]

 

 Email Address  [email or web address]

 

 Preferred Language  en

 

 Marital Status  Not  or 

 

 Hoahaoism Affiliation  Unknown

 

 Race  White

 

 Ethnic Group  Not  or 









Author







 Name  Irene Queen RN MS FNP

 

 Address  [address] [place] [id number]









Care Team Providers







 Name  Role  Phone

 

 Jesus Alberto Russell MD  Care Team Information   [phone]

 

 Lauri Zamudio DR  Care Team Information   [phone]

 

 Augustus Marion  Care Team Information   [phone]

 

 Mónica Fofana DR - Adult  Care Team Information   [phone]



 Reconstructive Orthopaedic Surgery    









Problems







 Active Problems  Provider  Date

 

 Benign essential hypertension  Obdulio Steward MD  Onset: 2006

 

 Aneurysm of thoracic aorta  Paloma Arnett MD  Onset: 2014

 

 Paroxysmal ventricular tachycardia  Paloma Arnett MD  Onset: 2014

 

 Paroxysmal atrial fibrillation  Paloma Arnett MD  Onset: 2015









 Note: 









 Essential hypertension  Irene Queen RN MS FNP  Onset: 2016







Social History







 Type  Date  Description  Comments

 

 Birth Sex    Unknown  

 

 Tobacco Use  Start: Unknown  Never Smoked Cigarettes  quitted 30yr ago,



       smoking 20 yrsX 1ppd

 

 ETOH Use    Consumes liquor 2 times  2-3 beers/wine



     per day  during social events



       2-3 shots of



       vodhka/whisky qHS

 

 Tobacco Use  Start: Unknown End:  Patient is a former  



   Unknown  smoker  

 

 Smoking Status  Reviewed: 19  Patient is a former  



     smoker  

 

 Enjoy Exercising    Enjoys Exercising  

 

 Exercise Type/Frequency    Exercises regularly  Not as active as he



       was due to left



       ankle pain.







Allergies, Adverse Reactions, Alerts







 Active Allergies  Reaction  Severity  Comments  Date

 

 Coumadin  rash      2014









 Inactive Allergies









 NKDA        03/10/2006







Medications







 Active Medications  SIG  Qnty  Indications  Ordering Provider  Date

 

 Amlodipine/Olmesarta  one tab daily for  90tabs  I10  Irene Queen  2019



 n Medoxomil  blood pressure      C, RN MS ISABELLE  



            5-20mg          



 Tablets          



           

 

 Metoprolol Succinate  Take 1 Tablet AT  90tabs  I48.2  Irene Queen  2017



 ER  Bedtime      C, RN MS ISABELLE  



   50mg Tablets ER          



 24HR          



           

 

 Pradaxa  Take 1 Capsule  180caps  I48.1  Irene Queen  2015



        150mg  Twice Daily      C, RN MS ISABELLE  



 Capsules          



           









 I48.2

 

 History Medications









 Amlodipine Besylate  1 by mouth every day  90tabs  I10  Irene Queen  2019 -



         KINDRA RN MS WALTON  2019



 5mg Tablets          



           

 

 Boostrix  ,5 cubic centimeters,  .500ml  Z23  Irene Queen  2019 -



   intramuscular x 1      MARI ARGUELLES MS  2019



 5-2.5-18.5LF-mcg/0.  injection        



 5 Suspension          



           







Medications Administered in Office







 Medication  SIG  Qnty  Indications  Ordering Provider  Date

 

 Depo Medrol 20 MG        Obdulio Steward MD  2001



        Injection          



           







Immunizations







 CPT Code  Status  Date  Vaccine  Lot #

 

 [id number]  Given  2019  Tdap (Adacel) Ages 7 And Above Only  

 

 95214  Given  2019  Pneumococcal 23 Immunization Adult Or  [id number]



       Immunosuppressed Patient  

 

 05098  Given  2018  Influenza Vac, Quadrivalent, Split, 0.5mL  [id number]



       Dosage, Im Use  

 

 64318  Given  2017  Influenza Vac, Quadrivalent, Split, 0.5mL  [id number]



       Dosage, Im Use  

 

 71771  Given  2016  Influenza Vac, Quadrivalent, Split, 0.5mL  



       Dosage, Im Use  

 

   Given  10/16/2015  Fluzone Trivalent Immunization  

 

 82880  Given  2015  Prevnar 13 Pneumococal Conjugate Vaccine  [id number]

 

 30910  Given  2004  Afluria Or Fluvirin Flu Vac Intramuscular  







Vital Signs







 Date  Vital  Result  Comment

 

 2019  9:53am  Weight  270.00 lb  









 Heart Rate  101 /min  72

 

 BP Systolic  104 mmHg  

 

 BP Diastolic  69 mmHg  

 

 Height  72 inches  6'0"

 

 BMI (Body Mass Index)  36.6 kg/m2  









 2019 12:58pm  Weight  276.25 lb  









 Heart Rate  81 /min  

 

 BP Systolic  114 mmHg  

 

 BP Diastolic  66 mmHg  

 

 Height  72 inches  6'0"

 

 BMI (Body Mass Index)  37.5 kg/m2  







Results







 Test  Date  Facility  Test  Result  H/L  Range  Note

 

 CBC with Auto Diff-fcmg  2019  [facility]  WBC  5.8 K/uL    4.1-11.0  









 RBC  3.53 M/uL  Low  4.60-6.10  

 

 Hemoglobin  12.2 gm/dL  Low  13.5-18.0  

 

 Hematocrit  36.0 %  Low  41.0-53.0  

 

 MCV  101.9 fL  High  80.0-97.0  

 

 MCH  34.6 pg  High  27.0-32.0  

 

 MCHC  34.0 g/dL    32.0-36.0  

 

 RDW  14.3 %    11.5-14.5  

 

 PLT Count  196 K/ul    140-400  

 

 MPV  8.0 FL    7.1-10.7  

 

 Neutrophil  65.3 %    35.0-75.0  

 

 Lymphocyte  21.7 %    16.0-52.0  

 

 Monocyte  10.6 %  High  2.0-10.0  

 

 Eosinophil  1.4 %    0.0-5.0  

 

 Basophil  1.0 %    0.0-4.0  

 

 Abs Neutrophils  3.8 K/uL    2.1-8.0  

 

 Abs Lymphocytes  1.3 K/uL    0.8-5.5  

 

 Abs Monocytes  0.6 K/uL    0.1-1.0  

 

 Abs Eosinophils  0.1 K/uL    0.0-0.5  

 

 Abs Basophils  0.1 K/uL    0.0-0.3  









 Comprehensive Met Panel-FCMG  2019  [facility]  Sodium  141 mmol/L    135-
146  1









 Potassium  4.5 mmol/L    3.5-5.2  

 

 Chloride#  104 mmol/L      2

 

 Carbon Dioxide  27 mmol/L    24-34  

 

 Calcium  9.3 mg/dL    8.5-10.5  3

 

 Glucose  97 mg/dL      

 

 BUN  23 mg/dL    6-26  

 

 Creatinine  1.1 mg/dL    0.5-1.4  

 

 Total Protein  6.2 g/dL    6.0-8.0  

 

 Albumin  4.1 g/dL    3.6-4.9  

 

 Globulin  2.1 g/dL    2.0-3.5  

 

 A/G Ratio  2.0 Ratio    1.0-2.2  

 

 Total Bilirubin  1.0 mg/dL    0.1-1.3  

 

 Alkaline Phosphatase  82 U/L      

 

 Alt  37 U/L    3-42  

 

 Ast  27 U/L    8-42  

 

 Anion Gap  10 mmol/L    5-15  4

 

 Female Egfr  46  Low  >60  5

 

 Male Egfr  61    >60  6









 Laboratory test  2019  [facility]  PSA  3.150 ng/mL    0.000-4.000  7



 finding              

 

 Hemoglobin A1c  2019  [facility]  Hemoglobin A1c  6.4 %  High  4.1-5.9  









 Estimated Average Glucose Calc  137 mg/dL      









 Laboratory test finding  2019  [facility]  Magnesium  1.7 mg/dL    1.5-2.7  

 

 Comprehensive Met Panel-FCMG  2019  [facility]  Sodium  140 mmol/L    135-
146  8, 9









 Potassium  4.8 mmol/L    3.5-5.2  

 

 Chloride#  105 mmol/L      10

 

 Carbon Dioxide  28 mmol/L    24-34  

 

 Calcium  9.2 mg/dL    8.5-10.5  11

 

 Glucose  128 mg/dL  High    

 

 BUN  22 mg/dL    6-26  

 

 Creatinine  0.8 mg/dL    0.5-1.4  

 

 Total Protein  6.3 g/dL    6.0-8.0  

 

 Albumin  4.1 g/dL    3.6-4.9  

 

 Globulin  2.2 g/dL    2.0-3.5  

 

 A/G Ratio  1.9 Ratio    1.0-2.2  

 

 Total Bilirubin  1.1 mg/dL    0.1-1.3  

 

 Alkaline Phosphatase  105 U/L      

 

 Alt  32 U/L    3-42  

 

 Ast  30 U/L    8-42  

 

 Anion Gap  7 mmol/L    5-15  12

 

 Female Egfr  69    >60  13

 

 Male Egfr  85    >60  14









 Hemoglobin A1c  2019  [facility]  Hemoglobin A1c  5.9 %    4.1-5.9  









 Estimated Average Glucose Calc  123 mg/dL      









 CBC with Auto Diff-fcmg  2019  [facility]  WBC  4.5 K/uL    4.1-11.0  









 RBC  3.99 M/uL  Low  4.60-6.10  

 

 Hemoglobin  13.7 gm/dL    13.5-18.0  

 

 Hematocrit  39.8 %  Low  41.0-53.0  

 

 MCV  99.8 fL  High  80.0-97.0  

 

 MCH  34.4 pg  High  27.0-32.0  

 

 MCHC  34.5 g/dL    32.0-36.0  

 

 RDW  13.5 %    11.5-14.5  

 

 PLT Count  186 K/ul    140-400  

 

 MPV  8.0 FL    7.1-10.7  

 

 Neutrophil  53.9 %    35.0-75.0  

 

 Lymphocyte  30.3 %    16.0-52.0  

 

 Monocyte  11.8 %  High  2.0-10.0  

 

 Eosinophil  2.7 %    0.0-5.0  

 

 Basophil  1.3 %    0.0-4.0  

 

 Abs Neutrophils  2.4 K/uL    2.1-8.0  

 

 Abs Lymphocytes  1.4 K/uL    0.8-5.5  

 

 Abs Monocytes  0.5 K/uL    0.1-1.0  

 

 Abs Eosinophils  0.1 K/uL    0.0-0.5  

 

 Abs Basophils  0.1 K/uL    0.0-0.3  









 Hemoglobin A1c  2019  [facility]  Hemoglobin A1c  6.2 %  High  4.1-5.9  









 Estimated Average Glucose Calc  131 mg/dL      









 Comprehensive Met Panel-FCMG  2019  [facility]  Sodium  142 mmol/L    135-
146  15









 Potassium  4.4 mmol/L    3.5-5.2  

 

 Chloride#  108 mmol/L      16

 

 Carbon Dioxide  25 mmol/L    24-34  

 

 Glucose  119 mg/dL  High    

 

 BUN  22 mg/dL    6-26  

 

 Creatinine  0.8 mg/dL    0.5-1.4  

 

 Calcium  9.5 mg/dL    8.5-10.2  

 

 Total Protein  6.2 g/dL    6.0-8.0  

 

 Albumin  4.4 g/dL    3.6-4.9  

 

 Globulin  1.8 g/dL  Low  2.0-3.5  

 

 A/G Ratio  2.4 Ratio  High  1.0-2.2  

 

 Total Bilirubin  0.7 mg/dL    0.1-1.3  

 

 Alkaline Phosphatase  108 U/L      

 

 Alt  31 U/L    3-42  

 

 Ast  27 U/L    8-42  

 

 Anion Gap  9 mmol/L    5-15  17

 

  Jeannine Egfr  >60    >60  18

 

 Non  Jeannine Egfr  >60    >60  19









 [facility]  [facility]  Cholesterol  163 mg/dL      









 Triglycerides  68 mg/dL      

 

 HDL  59 mg/dL    29-71  20

 

 Chol/ HDL Ratio  2.8 ratio  Low  4.0-6.7  

 

 VLDL  14 mg/dL      

 

 LDL (Calc)  91 mg/dL    20-99  21









 1  Updated reference range on new analyzer 3-2017

 

 2  Updated reference range on new analyzer 3-2017

 

 3  Updated reference range 2019

 

 4  Updated Reference Range 

 

 5  Concerning GFR Guidelines for  Americans:



   Normal function or mild renal disease, if clinically at risk:  >/= 60



   mL/min



   Moderately decreased:  30-59



   Severely decreased:  15-29



   Renal failure:  <15



   There is reduced accuracy above 60ml/min/1.73 m squared, but the



   numeric value may be clinically useful in the near 60 range

 

 6  Concerning GFR Guidelines:



   Normal function or mild renal disease, if clinically at risk:  >/= 60



   mL/min



   Moderately decreased:  30-59



   Severely decreased:  15-29



   Renal failure:  <15



   There is reduced accuracy above 60ml/min/1.73 m squared, but the



   numeric value may be clinically useful in the near 60 range



   



   Glomerular Filtration Rate (GFR) is estimated based on the CKD-EPI



   equation, which assumes a steady state for creatinine as recommended



   by the National Kidney Disease Education Program in conjunction with



   the National Institutes of Health and the National Kidney Foundation.



   



   Clinical conditions in which it may be necessary to measure GFR by



   using clearance methods include extremes of age and body size, severe



   malnutrition or obesity, diseases of skeletal muscle, paraplegia or



   quadriplegia, vegetarian diet, rapidly changing kidney function, and



   calculation of the dose of potentially toxic drugs that are excreted



   by the kidneys.

 

 7  Beginning 07 PSA values assayed at [facility] uses



   chemiluminescence methodology manufactured by [employer] for use



   on the DXI analyzer.  Values obtained with different assay methods or



   kits can not be used interchangeably.  Serum PSA measurement is not an



   absolute test for malignancy.  The PSA value should be used in



   conjunction with information available from clinical evaluation and



   other diagnostic procedures.

 

 8  This sample is drawn by:[facility].

 

 9  Updated reference range on new analyzer 3-2017

 

 10  Updated reference range on new analyzer 3-2017

 

 11  Updated reference range 2019

 

 12  Updated Reference Range 

 

 13  Concerning GFR Guidelines for  Americans:



   Normal function or mild renal disease, if clinically at risk:  >/= 60



   mL/min



   Moderately decreased:  30-59



   Severely decreased:  15-29



   Renal failure:  <15



   There is reduced accuracy above 60ml/min/1.73 m squared, but the



   numeric value may be clinically useful in the near 60 range

 

 14  Concerning GFR Guidelines:



   Normal function or mild renal disease, if clinically at risk:  >/= 60



   mL/min



   Moderately decreased:  30-59



   Severely decreased:  15-29



   Renal failure:  <15



   There is reduced accuracy above 60ml/min/1.73 m squared, but the



   numeric value may be clinically useful in the near 60 range



   



   Glomerular Filtration Rate (GFR) is estimated based on the CKD-EPI



   equation, which assumes a steady state for creatinine as recommended



   by the National Kidney Disease Education Program in conjunction with



   the National Institutes of Health and the National Kidney Foundation.



   



   Clinical conditions in which it may be necessary to measure GFR by



   using clearance methods include extremes of age and body size, severe



   malnutrition or obesity, diseases of skeletal muscle, paraplegia or



   quadriplegia, vegetarian diet, rapidly changing kidney function, and



   calculation of the dose of potentially toxic drugs that are excreted



   by the kidneys.

 

 15  Updated reference range on new analyzer 3-2017

 

 16  Updated reference range on new analyzer 3-2017

 

 17  Updated Reference Range 

 

 18  Concerning GFR Guidelines for  Americans:



   Normal function or mild renal disease, if clinically at risk:  >/= 60



   mL/min



   Moderately decreased:  30-59



   Severely decreased:  15-29



   Renal failure:  <15

 

 19  Concerning GFR Guidelines:



   Normal function or mild renal disease, if clinically at risk:  >/= 60



   mL/min



   Moderately decreased:  30-59



   Severely decreased:  15-29



   Renal failure:  <15



   



   Glomerular Filtration Rate (GFR) is estimated based on the MDRD



   equation, which assumes a steady state for creatinine as recommended



   by the National Kidney Disease Education Program in conjunction with



   the National Institutes of Health and the National Kidney Foundation.



   



   Clinical conditions in which it may be necessary to measure GFR by



   using clearance methods include extremes of age and body size, severe



   malnutrition or obesity, diseases of skeletal muscle, paraplegia or



   quadriplegia, vegetarian diet, rapidly changing kidney function, and



   calculation of the dose of potentially toxic drugs that are excreted



   by the kidneys.

 

 20  Per NCEP ATP III Guidelines:



   Results lower than 40 mg/dL are suggestive of increased risk for



   coronary artery disease.  Results > or = to 60 mg/dL are considered a



   negative risk factor.

 

 21  Per NCEP ATP III Guidelines:



   Normal Population <130



   Patients with medical conditions: CHD/DM



   Optimal: <100



   Borderline high: 130-159



   High: 160-189



   Very high: >189







Procedures







 Date  Code  Description  Status

 

 2019  14304  ECHO Transthoracis 2D W Spectral Doppler  Completed

 

 2019  02356  Electrocardiogram Complete  Completed

 

 2015  36334117  Colonoscopy  Completed

 

 2010  32646638  Colonoscopy  Completed







Medical Devices







 Description

 

 No Information Available







Encounters







 Type  Date  Location  Provider  Dx  Diagnosis

 

 Office Visit  2019  Irene Andrea,  I10  Essential (primary)



   1:00p    RN [facility]    hypertension









 R73.01  Impaired fasting glucose









 Office Visit  2019  8:20a  Irene Andrea,  E66.9  Obesity, 
unspecified



       RN [facility]    









 I10  Essential (primary) hypertension

 

 I48.2  Chronic atrial fibrillation

 

 M25.551  Pain in RIGHT hip

 

 M25.559  Pain in unspecified hip

 

 Z68.37  Body mass index (BMI) 37.0-37.9, adult









 Office Visit  2019  8:20a  Irene Andrea,  Z00.00  Encntr 
for general



       RN [facility]    adult medical exam



           w/o abnormal



           findings









 R73.01  Impaired fasting glucose

 

 I10  Essential (primary) hypertension

 

 M25.551  Pain in RIGHT hip

 

 E55.9  Vitamin D deficiency, unspecified

 

 Z23  Encounter for immunization

 

 D64.9  Anemia, unspecified

 

 Z13.31  Encounter for screening for depression

 

 Z68.37  Body mass index (BMI) 37.0-37.9, adult







Assessments







 Date  Code  Description  Provider

 

 2019  I10  Essential (primary) hypertension  [facility]

 

 2019  Z01.818  Encounter for other preprocedural  Irene Queen RN 
[facility]



     examination  

 

 2019  I34.0  Nonrheumatic mitral (valve)  [facility]



     insufficiency  

 

 2019  I34.0  Nonrheumatic mitral (valve)  Irene Queen RN [facility]



     insufficiency  

 

 2019  I48.1  Persistent atrial fibrillation  [facility]

 

 2019  I10  Essential (primary) hypertension  Irene Queen RN [facility]

 

 2019  I48.1  Persistent atrial fibrillation  Irene Queen RN [facility]

 

 2019  E66.9  Obesity, unspecified  Irene Queen, RN [facility]

 

 2019  Z12.5  Encounter for screening for malignant  Irene Queen RN [facility]



     neoplasm of prostate  

 

 2019  R73.01  Impaired fasting glucose  Irene Queen, RN [facility]

 

 2019  Z68.36  Body mass index (BMI) 36.0-36.9,  Irene Queen, RN [facility]



     adult  

 

 2019  I34.0  [facility]he[facility]val[facility]



     insufficiency  

 

 2019  I10  Essential (primary) hypertension  [facility]

 

 2019  Z12.5  Encounter for screening for malignant  [facility]



     neoplasm of prostate  

 

 2019  R73.01  Impaired fasting glucose  [facility]

 

 2019  I48.1  Persistent atrial fibrillation  [facility]

 

 2019  I10  Essential (primary) hypertension  Irene Queen, RN [facility]

 

 2019  R73.01  Impaired fasting glucose  Irene Queen, RN [facility]

 

 2019  D64.9  Anemia, unspecified  Nikole Milligan MD

 

 2019  I10  Essential (primary) hypertension  [facility]

 

 2019  I10  Essential (primary) hypertension  Nikole Milligan MD

 

 2019  R73.01  Impaired fasting glucose  [facility]

 

 2019  R73.01  Impaired fasting glucose  Nikole Milligan MD

 

 2019  Z68.37  Body mass index (BMI) 37.0-37.9,  [facility]



     adult  

 

 2019  Z68.37  Body mass index (BMI) 37.0-37.9,  Nikole Milligan MD



     adult  

 

 2019  D64.9  Anemia, unspecified  [facility]

 

 2019  E66.9  Obesity, unspecified  Irene Queen, RN [facility]

 

 2019  I10  Essential (primary) hypertension  Irene Queen, RN [facility]

 

 2019  I48.2  Chronic atrial fibrillation  Irene Queen, RN [facility]

 

 2019  M25.551  Pain in RIGHT hip  Irene Queen, RN [facility]

 

 2019  M25.559  Pain in unspecified hip  Irene Queen, RN [facility]

 

 2019  Z68.37  Body mass index (BMI) 37.0-37.9,  Irene Queen, RN [facility]



     adult  

 

 2019  Z00.00  Encounter for general adult medical  Irene Queen, RN 
[facility]



     examination without abno  

 

 2019  I10  Essential (primary) hypertension  [facility]

 

 2019  R73.01  Impaired fasting glucose  Irene Queen, RN [facility]

 

 2019  I10  Essential (primary) hypertension  rIene Queen, RN [facility]

 

 2019  M25.551  Pain in RIGHT hip  Irene Queen, RN [facility]

 

 2019  E55.9  Vitamin D deficiency, unspecified  Irene Queen, RN [facility]

 

 2019  Z23  Encounter for immunization  Irene Queen RN [facility]

 

 2019  D64.9  Anemia, unspecified  Irene Queen, RN [facility]

 

 2019  Z13.31  Encounter for screening for  Irene Queen, RN [facility]



     depression  

 

 2019  Z68.37  Body mass index (BMI) 37.0-37.9,  Irene Queen, RN [facility]



     adult  

 

 2019  R73.01  Impaired fasting glucose  [facility]







Plan of Treatment

2019 - [facility] for other 
preprocedural gffdyndtyolL58.0 Nonrheumatic mitral (valve) wuhkskfsqafmbT12 
Essential (primary) ouelpyrcyxvtP52.1 Persistent atrial fibrillationNew Orders:
Holter Monitor 24 Hour, Scheduled: 19Comments:SEES CARDIOLOGY ON  TO 
REVIEW DIRECTIONS FOR ANTICOAGULATION PRE AND POST OPE66.9 Obesity, 
watkvuxfrnyB69.5 Encounter for screening for malignant neoplasm of 
yyqqcztiA08.01 Impaired fasting uxhtensF45.36 Body mass index (BMI) 36.0-36.9, 
adult



Functional Status







 Description

 

 No Information Available







Mental Status







 Description

 

 No Information Available







Referrals







 Refer to   Reason for Referral  Status  Appt Date

 

 Mónica Fofana DR  CONSULT FOR RT HIP PAIN, PLEASE SEND  Received Complete  



   XRAY REPORT  5/23/19- FAXED -DS 6/3    



   SPOKE TO [facility]ANN[facility] AT OFFICE- REFERRAL    



   WAS RECEIVED AND THEY WILL NOTIFY US    



   WHEN PT IS SCHEDULED..TW  SPOKE TO    



   [facility] AT OFFICE- THEY HAVE REFERRAL    



   BUT PT HAS NOT BEEN SCHEDULED YET AND    



   SHE IS NOT SURE WHY- LMOM FOR PT TO    



   CALL ME TO ADVISE HIM TO CALL OFFICE    



   FOR APPT..TW -PT CALLED BACK AND    



   GIVEN THE INFO TO CALL TO SCHEDULE-ANEL Mcghee DR

 

 [place], [facility]24 [phone]

## 2019-10-01 NOTE — XMS REPORT
Continuity of Care Document (CCD)

 Created on:2019



Patient:Jorge L Montanez

Sex:Male

:1940

External Reference #:[id number]





Demographics







 Address  [address] [place] [address]

 

 Home Phone  [phone]

 

 Mobile Phone  [phone]

 

 Email Address  [email or web address]

 

 Preferred Language  en

 

 Marital Status  Not  or 

 

 Restorationism Affiliation  Unknown

 

 Race  White

 

 Ethnic Group  Not  or 









Author







 Name  Mónica Fofana M.D. (transmitted by agent of provider Vasu Marti)

 

 Address  [address]



   [place]



   [place] [address]









Care Team Providers







 Name  Role  Phone

 

 Irene Queen, NP - Family  Care Team Information   [phone]
[phone]









Problems







 Active Problems  Provider  Date

 

 Localized, primary osteoarthritis of the pelvic  Mónica Fofana M.D.  Onset: 



 region and thigh    







Social History







 Type  Date  Description  Comments

 

 Birth Sex    Unknown  

 

 ETOH Use    Currently consumes alcohol  14 per week

 

 ETOH Use    Drinks 1 Alcoholic Beverage  



     Per Week  

 

 Tobacco Use  Start: Unknown End:  Patient is a former smoker  



   Unknown    

 

 Smoking Status  Reviewed: 19  Patient is a former smoker  

 

 Exercise Type/Frequency    Exercises sporadically  







Allergies, Adverse Reactions, Alerts







 Active Allergies  Reaction  Severity  Comments  Date

 

 Coumadin        2019







Medications







 Active Medications  SIG  Qnty  Indications  Ordering Provider  Date

 

 Amlodipine/Olmesartan        Irene Queen, NP  



 Medoxomil          



 5-20mg Tablets          



           

 

 Losartan Potassium        Irene Queen, NP  



         50mg Tablets          



           

 

 Metoprolol Succinate ER        Irene Queen, NP  



              50mg Tablets ER          



 24HR          

 

 Pradaxa        Irene Queen, NP  



 150mg Capsules          



           







Medications Administered in Office







 Medication  SIG  Qnty  Indications  Ordering Provider  Date

 

 Depomedrol 40MG        Mónica Fofana M.D.  07/15/2019



   Injection          







Immunizations







 Description

 

 No Information Available







Vital Signs







 Date  Vital  Result  Comment

 

 2019  9:32am  Height  73 inches  6'1"









 Weight  269.00 lb  

 

 Heart Rate  80 /min  

 

 BP Systolic  142 mmHg  

 

 BP Diastolic  90 mmHg  

 

 Respiratory Rate  18 /min  

 

 Body Temperature  97.9 F  

 

 Pain Level  7  

 

 BMI (Body Mass Index)  35.5 kg/m2  









 07/15/2019  9:46am  Height  73 inches  6'1"









 Weight  275.00 lb  

 

 Heart Rate  74 /min  

 

 BP Systolic  108 mmHg  

 

 BP Diastolic  68 mmHg  

 

 Respiratory Rate  12 /min  

 

 Pain Level  2  

 

 BMI (Body Mass Index)  36.3 kg/m2  







Results







 Test  Date  Facility  Test  Result  H/L  Range  Note

 

 Xray  07/15/2019  [facility]  Inj/Aspir Major JT Or Bursa W/  <pending>      



       US        







Procedures







 Date  Code  Description  Status

 

 07/15/2019  59673  Inj/Aspir Major JT Or Bursa W/ US  Completed







Medical Devices







 Description

 

 No Information Available







Encounters







 Type  Date  Location  Provider  Dx  Diagnosis

 

 Office Visit  2019  Orthopedic  Mónica Fofana  M25.551  Pain in right hip



   2:30p  [facility]    









 M16.11  Unilateral primary osteoarthritis, right hip







Assessments







 Date  Code  Description  Provider

 

 2019  M16.11  Unilateral primary osteoarthritis, right hip  Mónica Fofana M.D.

 

 2019  M25.551  Pain in right hip  Mónica Fofana M.D.

 

 07/15/2019  M25.551  Pain in right hip  Mónica Fofana M.D.

 

 07/15/2019  M16.11  Unilateral primary osteoarthritis, right hip  Mónica Fofana M.D.

 

 2019  M25.551  Pain in right hip  Mónica Fofana M.D.

 

 2019  M16.11  Unilateral primary osteoarthritis, right hip  Mónica Fofana M.D.







Plan of Treatment

Future Appointment(s):10/14/2019  1:45 pm - [facility]. [facility]  7:30 am - Mónica [facility]ofana M.D. at [facility] - Mónica Fofana M.D.M16.11 Unilateral primary 
osteoarthritis, right hipFollow up:Follow up:   to the ORM25.551 Pain in right 
hip



Functional Status







 Description

 

 No Information Available







Mental Status







 Description

 

 No Information Available







Referrals







 Description

 

 No Information Available

## 2019-10-01 NOTE — PN
Progress Note





- Progress Note


Date of Service: 10/01/19 - Post-op


Note: 


Post-op: S/P right REBECA- Patient resting comfortably in bed. Denies pain at this 

time, no SOB or CP. He can actively DF/PF, sensation intact, 2+ DP pulses. He 

has not been out of bed with PT yet. Continue PT, and pain management. We 

appreciate medicine's  and we will follow.

## 2019-10-01 NOTE — OP
Operative Report - Blank





- Operative Report


Date of Operation: 10/01/19


Note: 





MARKIE CELIS





1940





Date Of Surgery: 10/1/19





Mónica Fofana MD





Assistant: GERALDO PHOENIX did help throughout the procedure with preparation of 

the hip, wound retraction, manipulation of the hip, and wound closure.  





Anesthesiologist: GERALDO Loaiza MD





Anesthesia Type: Spinal





Preoperative Diagnosis: Right severe degenerative osteoarthritis of the hip





Postoperative Diagnosis: As above, Right Gluteus Medius Tendon Tear





Procedure Performed: Right Total Hip Arthroplasty with primary repair gluteus 

medius tendon





Complications: None





Specimen: Femoral head and acetabular reamings sent to pathology. 


   


Hardware used: This is uncemented [place] total hip arthroplasty hardware  for 

the femur a size 8 accolade II with 127 neck angle femoral component, for the 

acetabulum a size 56F trident II tritanium cluster hole shell, one 15 mm screw,

  for the insert a size 40F trident X3 polyethylene insert, and for the femoral 

head a size 40+4 LFIT metal V40 femoral head.  





Brief history/Indication:  MARKIE CELIS was known in clinic and had a 

history of severe right hip pain. He failed conservative treatment with anti-

inflammatories, pain pills, intra-articular injections and physical therapy. He 

elected to undergo right total hip arthroplasty due to continued pain and 

decreased quality of life. Radiographs showed severe end stage osteoarthritis 

of the hip with bone on bone contact.  Informed consent was obtained from the 

patient. He understood the risks of surgery included but were not limited to: 

bleeding, infection, damage to nearby structures, intraoperative fracture, 

nerve palsy, failure of the hardware, early loosening, stiffness or loss of 

motion, dislocation, leg length discrepancy, anesthesia complications, stroke, 

heart attack, blood clot and death. He wished to proceed.  





Intra-Operative findings: Intraoperatively the patient was noted to have severe 

loss of cartilage of the acetabulum and femoral head.  





Description of the Procedure: 





MARKIE CELIS was identified in the preanesthesia unit. His right hip was 

marked as the correct operative side. Informed consent was signed and placed in 

the chart. The patient was taken to the operating room and placed under 

anesthesia without complication. A joya catheter was placed. The patient was 

placed on the peg board with all bony prominences well padded. The right lower 

extremity was prepped and draped in the usual sterile fashion. Preoperative time

-out was made to correctly identify the patient, side and site. Appropriate 

intraoperative antibiotics were given within one hour of incision.  





A standard posterior incision was made and carried sharply down to the lateral 

fascia. A new 10 blade was used to make an incision in the fascia in line with 

the skin incision. A charnley retractor was placed.   The gluteus medius 

abductor tendon had a partial tear with proximal retraction.   The piriformis 

and conjoined tendons were identified and elevated off the posterolateral femur 

using electrocautery. These were tagged with number 5 Ethibond. Next 

electrocautery was used to make a posterolateral capsular flap and this was 

tagged with number 5 Ethibonds. The hip was carefully dislocated. 


   


Lesser trochanter to the center of the femoral head was measured at 70 mm. The 

oscillating saw was used to make the femoral neck cut. The femoral head was 

carefully removed. The femur was retracted anteriorly and the acetabular 

retractors were placed. Long-handled knife was used to sharply remove any 

remaining labrum from the acetabular rim. The acetabulum was sequentially 

reamed up to a size 56.  A bleeding subchondral bone bed was obtained. A trial 

liner was placed and had excellent fit and stability. A 56F trident II 

tritanium cup with a 15 mm screw was placed and had excellent stability with 

appropriate anteversion and abduction angle. A size 40F polyethylene liner was 

impacted into the acetabular shell. The liner was checked for stability and was 

stable.  





Next attention was turned to preparation of the femoral canal. A canal finder 

was used to enter the proximal femur. The femoral canal was sequentially 

broached up to a size 8  femoral broach trial. A trial neck and 40 + 4  trial 

femoral head was chosen. Lesser trochanter to center of the femoral head 

measurement was satisfactory. The hip was reduced and taken through a range of 

motion.  The hip was stable in all positions with good soft tissue tension and 

appropriate leg lengths. The hip was dislocated and all trials were removed.  





The final implant chosen was a accolade II size 8 with 127 neck angle. This 

stem was impacted into the femoral canal without difficulty. The stem was 

stable with appropriate anteversion. The femoral head chosen was a 40 + 4 metal 

head.   The head was impacted onto the femoral neck without difficulty. The 

final lesser trochanter to center of the femoral head measurement was 

satisfactory. The hip was reduced and taken through a range of motion. The hip 

was stable in all positions with good soft tissue tension and appropriate leg 

lengths. The hip was copiously irrigated with sterile saline.  





The previously tagged capsule and tendons were repaired to the posterolateral 

femur through two trochanteric drill holes. Number 5 ethibonds were used to 

primarily repair the torn abductor tendon.  The lateral fascia layer was closed 

using number 1 vicryls. The rest of the incision was closed in a layered 

fashion using 0 and 2-0 vicryls. The skin was closed using 3-0 monocryl suture 

and Dermabond.  Sterile adaptic, 4x4s and paper tape was used to cover the 

incision. The patients anesthesia was reversed without difficulty. He was 

taken to the PACU in stable condition. Intended weight-bearing will be as 

tolerated with posterior hip precautions.

## 2019-10-02 LAB
ANION GAP SERPL CALC-SCNC: 5 MMOL/L (ref 2–11)
BUN SERPL-MCNC: 26 MG/DL (ref 6–24)
BUN/CREAT SERPL: 27.4 (ref 8–20)
CALCIUM SERPL-MCNC: 8.5 MG/DL (ref 8.6–10.3)
CHLORIDE SERPL-SCNC: 106 MMOL/L (ref 101–111)
GLUCOSE SERPL-MCNC: 149 MG/DL (ref 70–100)
HCO3 SERPL-SCNC: 28 MMOL/L (ref 22–32)
HCT VFR BLD AUTO: 31 % (ref 42–52)
HGB BLD-MCNC: 10.6 G/DL (ref 14–18)
PLATELET # BLD AUTO: 167 10^3/UL (ref 150–450)
POTASSIUM SERPL-SCNC: 4.3 MMOL/L (ref 3.5–5)
SODIUM SERPL-SCNC: 139 MMOL/L (ref 135–145)

## 2019-10-02 RX ADMIN — Medication SCH: at 17:43

## 2019-10-02 RX ADMIN — OXYCODONE HYDROCHLORIDE AND ACETAMINOPHEN PRN TAB: 5; 325 TABLET ORAL at 10:55

## 2019-10-02 RX ADMIN — DABIGATRAN ETEXILATE MESYLATE SCH MG: 150 CAPSULE ORAL at 08:46

## 2019-10-02 RX ADMIN — OXYCODONE HYDROCHLORIDE AND ACETAMINOPHEN SCH MG: 500 TABLET ORAL at 08:44

## 2019-10-02 RX ADMIN — AMLODIPINE BESYLATE SCH MG: 5 TABLET ORAL at 08:47

## 2019-10-02 RX ADMIN — MAGNESIUM HYDROXIDE SCH: 400 SUSPENSION ORAL at 19:45

## 2019-10-02 RX ADMIN — MAGNESIUM HYDROXIDE SCH ML: 400 SUSPENSION ORAL at 08:47

## 2019-10-02 RX ADMIN — THERA TABS SCH TAB: TAB at 08:45

## 2019-10-02 RX ADMIN — OXYCODONE HYDROCHLORIDE AND ACETAMINOPHEN PRN TAB: 5; 325 TABLET ORAL at 17:57

## 2019-10-02 RX ADMIN — SODIUM CHLORIDE, SODIUM LACTATE, POTASSIUM CHLORIDE, AND CALCIUM CHLORIDE SCH MLS/HR: 600; 310; 30; 20 INJECTION, SOLUTION INTRAVENOUS at 01:30

## 2019-10-02 RX ADMIN — CEFAZOLIN SCH MLS/HR: 1 INJECTION, POWDER, FOR SOLUTION INTRAVENOUS at 01:30

## 2019-10-02 RX ADMIN — VALSARTAN SCH MG: 160 TABLET ORAL at 08:44

## 2019-10-02 RX ADMIN — DOCUSATE SODIUM SCH MG: 100 CAPSULE, LIQUID FILLED ORAL at 08:47

## 2019-10-02 RX ADMIN — DOCUSATE SODIUM SCH: 100 CAPSULE, LIQUID FILLED ORAL at 19:45

## 2019-10-02 RX ADMIN — CYANOCOBALAMIN TAB 500 MCG SCH MCG: 500 TAB at 08:45

## 2019-10-02 RX ADMIN — METOPROLOL SUCCINATE SCH MG: 50 TABLET, EXTENDED RELEASE ORAL at 17:57

## 2019-10-02 RX ADMIN — CEFAZOLIN SCH MLS/HR: 1 INJECTION, POWDER, FOR SOLUTION INTRAVENOUS at 09:30

## 2019-10-02 RX ADMIN — DABIGATRAN ETEXILATE MESYLATE SCH MG: 150 CAPSULE ORAL at 19:48

## 2019-10-02 RX ADMIN — THERA TABS SCH TAB: TAB at 08:44

## 2019-10-02 RX ADMIN — OXYCODONE HYDROCHLORIDE AND ACETAMINOPHEN PRN TAB: 5; 325 TABLET ORAL at 06:28

## 2019-10-02 NOTE — PN
Progress Note





- Progress Note


Date of Service: 10/02/19


SOAP: 


Subjective:


[] Pt seen OOB in chair. He feels well without CP, SOB, dizziness or nausea. 





Objective:


[]Gen: Appears well, NAD


RLE: R hip dressing CDI without erythema. Thigh soft. DF/PF intact, DP2+. 

sensation intact to light touch distally.


Calves supple and nontender without erythema, edema or palpable cords





Assessment:


[]POD 1 sp right total hip arthroplasty





Plan:


[]WBAT


PT/OT


Home dose of pradaxa for a fib/ dvt prophylaxis








 Vital Signs











Temp  99.2 F   10/02/19 11:41


 


Pulse  70   10/02/19 11:41


 


Resp  16   10/02/19 12:00


 


BP  100/73   10/02/19 11:41


 


Pulse Ox  96   10/02/19 11:41








 Intake & Output











 10/01/19 10/02/19 10/02/19





 18:59 06:59 18:59


 


Intake Total 1225 1362 1305


 


Output Total 300 650 


 


Balance 


 


Weight 267 lb  


 


Intake:   


 


  IV Fluids 1975 375 1463


 


    ABX - CEFAZOLIN   115


 


    LR 1225 990 990


 


  IVPB  52 


 


    ABX - CEFAZOLIN  52 


 


  Oral  320 200


 


Output:   


 


  Ricci 300 650 


 


Other:   


 


  Estimated Void   Medium


 


  # Bowel Movements  0 








 Laboratory Last Values











Hgb  10.6 g/dL (14.0-18.0)  L  10/02/19  06:02    


 


Hct  31 % (42-52)  L  10/02/19  06:02    


 


Plt Count  167 10^3/uL (150-450)   10/02/19  06:02    


 


MPV  7.6 fL (7.4-10.4)   10/02/19  06:02    


 


INR (Anticoag Therapy)  0.98  (0.82-1.09)   10/01/19  08:15    


 


APTT  33.2 seconds (26.0-38.0)   10/01/19  08:15    


 


Sodium  139 mmol/L (135-145)   10/02/19  06:01    


 


Potassium  4.3 mmol/L (3.5-5.0)   10/02/19  06:01    


 


Chloride  106 mmol/L (101-111)   10/02/19  06:01    


 


Carbon Dioxide  28 mmol/L (22-32)   10/02/19  06:01    


 


Anion Gap  5 mmol/L (2-11)   10/02/19  06:01    


 


BUN  26 mg/dL (6-24)  H  10/02/19  06:01    


 


Creatinine  0.95 mg/dL (0.67-1.17)   10/02/19  06:01    


 


Est GFR ( Amer)  93.0  (>60)   10/02/19  06:01    


 


Est GFR (Non-Af Amer)  76.9  (>60)   10/02/19  06:01    


 


BUN/Creatinine Ratio  27.4  (8-20)  H  10/02/19  06:01    


 


Glucose  149 mg/dL ()  H  10/02/19  06:01    


 


Calcium  8.5 mg/dL (8.6-10.3)  L  10/02/19  06:01

## 2019-10-03 VITALS — SYSTOLIC BLOOD PRESSURE: 116 MMHG | DIASTOLIC BLOOD PRESSURE: 60 MMHG

## 2019-10-03 LAB
HCT VFR BLD AUTO: 30 % (ref 42–52)
HGB BLD-MCNC: 10.2 G/DL (ref 14–18)
PLATELET # BLD AUTO: 153 10^3/UL (ref 150–450)

## 2019-10-03 RX ADMIN — VALSARTAN SCH MG: 160 TABLET ORAL at 10:11

## 2019-10-03 RX ADMIN — OXYCODONE HYDROCHLORIDE PRN MG: 5 CAPSULE ORAL at 13:03

## 2019-10-03 RX ADMIN — MAGNESIUM HYDROXIDE SCH: 400 SUSPENSION ORAL at 10:11

## 2019-10-03 RX ADMIN — DOCUSATE SODIUM SCH: 100 CAPSULE, LIQUID FILLED ORAL at 10:12

## 2019-10-03 RX ADMIN — THERA TABS SCH TAB: TAB at 10:11

## 2019-10-03 RX ADMIN — OXYCODONE HYDROCHLORIDE PRN MG: 5 CAPSULE ORAL at 05:52

## 2019-10-03 RX ADMIN — OXYCODONE HYDROCHLORIDE AND ACETAMINOPHEN PRN TAB: 5; 325 TABLET ORAL at 10:12

## 2019-10-03 RX ADMIN — DABIGATRAN ETEXILATE MESYLATE SCH MG: 150 CAPSULE ORAL at 10:10

## 2019-10-03 RX ADMIN — CYANOCOBALAMIN TAB 500 MCG SCH MCG: 500 TAB at 10:10

## 2019-10-03 RX ADMIN — AMLODIPINE BESYLATE SCH MG: 5 TABLET ORAL at 10:11

## 2019-10-03 RX ADMIN — OXYCODONE HYDROCHLORIDE AND ACETAMINOPHEN SCH MG: 500 TABLET ORAL at 10:12

## 2019-10-03 RX ADMIN — OXYCODONE HYDROCHLORIDE AND ACETAMINOPHEN PRN TAB: 5; 325 TABLET ORAL at 03:16

## 2019-10-03 NOTE — DS
Orthopedic Discharge Summary





- Discharge Summary





Date of Admission:10/01/19


Date of Discharge: 10/3/19


Date of Surgery: 10/1/19


Attending Orthopedic Provider: Dr Fofana


Pre-operative Diagnosis: Right hip osteoarthritis


Operative Procedure: right total hip replacement


Disposition of Patient: home with vns


Condition of Patient: stable


History: MARKIE CELIS is a 79 year old M with years of increasingly severe 

right hip pain. Patient has failed conservative management and has elected to 

undergo a right total hip replacement


Hospital Course: MARKIE was admitted to [facility] on 10/01/19. 

Patient underwent a right total hip replacement without complication followed 

by a brief recovery in PACU and transfer to the Short Stay Surgical Unit in 

stable condition. Our hospitalist service, physical therapy and occupational 

therapy also participated in this patients care. Post-op day 1: patient was 

alert and in no acute distress. Dressing was clean, dry and intact. Operative 

extremity dorsiflexion and plantarflexion intact, sensation intact to light 

touch distally, DP2+. Post-op day two: dressing was changed, incision was clean

, dry and intact. Patient was deemed to be medically and orthopedically stable 

for discharge. Physical therapy goals were met.





 Home Medications











 Medication  Instructions  Recorded  Confirmed  Type


 


Dabigatran CAP(NF) [Pradaxa 150 mg PO BID 05/11/15 10/01/19 History





CAP(NF)]    


 


Acetaminophen [Tylenol Extra 1 tab PO QAM PRN 09/23/19 10/01/19 History





Strength]    


 


Amlodipine Bes/Olmesartan Med 1 tab PO QAM 09/23/19 10/01/19 History





[Amlodipine-Olmesartan 5-20 mg]    


 


Ascorbic Acid [Vitamin C] 500 mg PO QAM 09/23/19 10/01/19 History


 


Cyanocobalamin TAB* [Vitamin B12 1,000 mcg PO DAILY 09/23/19 10/01/19 History





TAB*]    


 


Metoprolol Succinate 50 mg PO QPM 09/23/19 10/01/19 History


 


Multivit-Min/FA/Lycopen/Lutein 1 tab PO QAM 09/23/19 10/01/19 History





[Centrum Silver Men Tablet]    


 


Iron 370 mg PO QPM 10/01/19 10/01/19 History


 


Acetaminophen TAB* [Tylenol TAB*] 650 mg PO Q8HR PRN  tab 10/03/19  Rx


 


Docusate CAP* [Colace Cap*] 100 mg PO BID #90 cap 10/03/19  Rx


 


oxyCODONE/Acetamin 5/325 MG* 1 tab PO Q4H PRN #0 tab MDD 10 10/03/19  Rx





[Percocet 5/325 TAB*]    


 


oxyCODONE/Acetamin 5/325 MG* 2 tab PO Q4H PRN #70 tab MDD 10 10/03/19  Rx





[Percocet 5/325 TAB*]    











Discharge Instructions following Orthopedic Surgery:





Activity:  


* Weight Bearing as tolerated


* Continue physical therapy and occupational therapy exercises as shown


* Home PT





Wound care: 


* OK to shower on post-op day 3, no bathing, swimming, or submerging wound. 


* Use gentle soap, pat dry. Cover with gauze, ACE wrap or tape.


* Visiting home nurse to do wound checks.





Call Orthopedic office for: 


* Increased drainage


* Redness


* Increased pain


* Fever 





***Go to ER with shortness of breath or chest pain.***





Diet: 


* Regular diet


* Increase fluids and fiber to prevent constipation. 


* Continue to use stool softeners, call office if no bowel motion within 48 

hours.








Medications


See Home Medication List in your packet for medications that you should take 

after discharge.





DVT Prophylaxis:





   Eliquis Dosin.5 mg, 1 tab every 12 hours x 30 days. 





Pain Control:





   Percocet Dosin/325 mg 1-2 tabs by mouth every 4-6 hours as needed for 

pain. Maximum of 10 tabs per day.





   Celebrex Dosin mg twice a day





**Please note that Percocet contains Tylenol (acetaminophen). Maximum daily 

dose of Tylenol is 4000 mg from all sources.**





Antibiotics are required prior to any dental work.








FOLLOW UP: Follow up with  [leonela] Within 10-14 days, call for appointment





Please call our office with any questions or concerns ([phone])








[facility]

## 2019-10-03 NOTE — PN
Progress Note





- Progress Note


Date of Service: 10/03/19


SOAP: 


Subjective:


[]Pt seen at bedside. He feels well and desires DC home. Denies CP, SOB, 

dizziness, nausea. Seen by medicine, stable for DC. 








Objective:


[]Gen: Appears well, NAD


RLE: R hip dressing changed incision CDI without erythema. Thigh soft. DF/PF 

intact, DP2+. sensation intact to light touch distally.


Calves supple and nontender without erythema, edema or palpable cords





Assessment:


[]POD 2 sp right total hip arthroplasty





Plan:


[]WBAT


PT/OT


Home dose of pradaxa for a fib/ dvt prophylaxis








 Vital Signs











Temp  99.9 F   10/03/19 11:50


 


Pulse  109   10/03/19 11:50


 


Resp  16   10/03/19 11:50


 


BP  116/60   10/03/19 11:50


 


Pulse Ox  96   10/03/19 11:50








 Intake & Output











 10/02/19 10/03/19 10/03/19





 18:59 06:59 18:59


 


Intake Total 1305 620 240


 


Output Total 300 250 


 


Balance 1005 370 240


 


Intake:   


 


  IV Fluids 1105  


 


    ABX - CEFAZOLIN 115  


 


      


 


  Oral 200 620 240


 


Output:   


 


  Urine 300 250 


 


Other:   


 


  Estimated Void Medium  


 


  Estimated Stool Amount  Small 








 Laboratory Last Values











Hgb  10.2 g/dL (14.0-18.0)  L  10/03/19  04:30    


 


Hct  30 % (42-52)  L  10/03/19  04:30    


 


Plt Count  153 10^3/uL (150-450)   10/03/19  04:30    


 


MPV  7.9 fL (7.4-10.4)   10/03/19  04:30    


 


INR (Anticoag Therapy)  0.98  (0.82-1.09)   10/01/19  08:15    


 


APTT  33.2 seconds (26.0-38.0)   10/01/19  08:15    


 


Sodium  139 mmol/L (135-145)   10/02/19  06:01    


 


Potassium  4.3 mmol/L (3.5-5.0)   10/02/19  06:01    


 


Chloride  106 mmol/L (101-111)   10/02/19  06:01    


 


Carbon Dioxide  28 mmol/L (22-32)   10/02/19  06:01    


 


Anion Gap  5 mmol/L (2-11)   10/02/19  06:01    


 


BUN  26 mg/dL (6-24)  H  10/02/19  06:01    


 


Creatinine  0.95 mg/dL (0.67-1.17)   10/02/19  06:01    


 


Est GFR ( Amer)  93.0  (>60)   10/02/19  06:01    


 


Est GFR (Non-Af Amer)  76.9  (>60)   10/02/19  06:01    


 


BUN/Creatinine Ratio  27.4  (8-20)  H  10/02/19  06:01    


 


Glucose  149 mg/dL ()  H  10/02/19  06:01    


 


Calcium  8.5 mg/dL (8.6-10.3)  L  10/02/19  06:01